# Patient Record
Sex: MALE | Race: OTHER | NOT HISPANIC OR LATINO | URBAN - METROPOLITAN AREA
[De-identification: names, ages, dates, MRNs, and addresses within clinical notes are randomized per-mention and may not be internally consistent; named-entity substitution may affect disease eponyms.]

---

## 2018-05-30 ENCOUNTER — EMERGENCY (EMERGENCY)
Facility: HOSPITAL | Age: 63
LOS: 1 days | Discharge: HOME | End: 2018-05-30
Attending: EMERGENCY MEDICINE | Admitting: EMERGENCY MEDICINE
Payer: MEDICAID

## 2018-05-30 VITALS
RESPIRATION RATE: 18 BRPM | OXYGEN SATURATION: 100 % | HEART RATE: 70 BPM | DIASTOLIC BLOOD PRESSURE: 72 MMHG | SYSTOLIC BLOOD PRESSURE: 134 MMHG

## 2018-05-30 VITALS
TEMPERATURE: 98 F | SYSTOLIC BLOOD PRESSURE: 160 MMHG | HEART RATE: 70 BPM | DIASTOLIC BLOOD PRESSURE: 88 MMHG | OXYGEN SATURATION: 99 % | RESPIRATION RATE: 19 BRPM

## 2018-05-30 LAB
ALBUMIN SERPL ELPH-MCNC: 4 G/DL — SIGNIFICANT CHANGE UP (ref 3.3–5)
ALP SERPL-CCNC: 65 U/L — SIGNIFICANT CHANGE UP (ref 40–120)
ALT FLD-CCNC: 17 U/L — SIGNIFICANT CHANGE UP (ref 4–41)
APPEARANCE UR: CLEAR — SIGNIFICANT CHANGE UP
AST SERPL-CCNC: 16 U/L — SIGNIFICANT CHANGE UP (ref 4–40)
BASOPHILS # BLD AUTO: 0.02 K/UL — SIGNIFICANT CHANGE UP (ref 0–0.2)
BASOPHILS NFR BLD AUTO: 0.4 % — SIGNIFICANT CHANGE UP (ref 0–2)
BILIRUB SERPL-MCNC: 0.3 MG/DL — SIGNIFICANT CHANGE UP (ref 0.2–1.2)
BILIRUB UR-MCNC: NEGATIVE — SIGNIFICANT CHANGE UP
BLOOD UR QL VISUAL: NEGATIVE — SIGNIFICANT CHANGE UP
BUN SERPL-MCNC: 30 MG/DL — HIGH (ref 7–23)
CALCIUM SERPL-MCNC: 8.7 MG/DL — SIGNIFICANT CHANGE UP (ref 8.4–10.5)
CHLORIDE SERPL-SCNC: 104 MMOL/L — SIGNIFICANT CHANGE UP (ref 98–107)
CO2 SERPL-SCNC: 22 MMOL/L — SIGNIFICANT CHANGE UP (ref 22–31)
COLOR SPEC: SIGNIFICANT CHANGE UP
CREAT SERPL-MCNC: 1.62 MG/DL — HIGH (ref 0.5–1.3)
EOSINOPHIL # BLD AUTO: 0.12 K/UL — SIGNIFICANT CHANGE UP (ref 0–0.5)
EOSINOPHIL NFR BLD AUTO: 2.4 % — SIGNIFICANT CHANGE UP (ref 0–6)
GLUCOSE SERPL-MCNC: 111 MG/DL — HIGH (ref 70–99)
GLUCOSE UR-MCNC: NEGATIVE — SIGNIFICANT CHANGE UP
HCT VFR BLD CALC: 39.3 % — SIGNIFICANT CHANGE UP (ref 39–50)
HGB BLD-MCNC: 12.8 G/DL — LOW (ref 13–17)
IMM GRANULOCYTES # BLD AUTO: 0.02 # — SIGNIFICANT CHANGE UP
IMM GRANULOCYTES NFR BLD AUTO: 0.4 % — SIGNIFICANT CHANGE UP (ref 0–1.5)
KETONES UR-MCNC: NEGATIVE — SIGNIFICANT CHANGE UP
LEUKOCYTE ESTERASE UR-ACNC: NEGATIVE — SIGNIFICANT CHANGE UP
LYMPHOCYTES # BLD AUTO: 2.23 K/UL — SIGNIFICANT CHANGE UP (ref 1–3.3)
LYMPHOCYTES # BLD AUTO: 45 % — HIGH (ref 13–44)
MCHC RBC-ENTMCNC: 27.9 PG — SIGNIFICANT CHANGE UP (ref 27–34)
MCHC RBC-ENTMCNC: 32.6 % — SIGNIFICANT CHANGE UP (ref 32–36)
MCV RBC AUTO: 85.6 FL — SIGNIFICANT CHANGE UP (ref 80–100)
MONOCYTES # BLD AUTO: 0.4 K/UL — SIGNIFICANT CHANGE UP (ref 0–0.9)
MONOCYTES NFR BLD AUTO: 8.1 % — SIGNIFICANT CHANGE UP (ref 2–14)
NEUTROPHILS # BLD AUTO: 2.17 K/UL — SIGNIFICANT CHANGE UP (ref 1.8–7.4)
NEUTROPHILS NFR BLD AUTO: 43.7 % — SIGNIFICANT CHANGE UP (ref 43–77)
NITRITE UR-MCNC: NEGATIVE — SIGNIFICANT CHANGE UP
NRBC # FLD: 0 — SIGNIFICANT CHANGE UP
PH UR: 6.5 — SIGNIFICANT CHANGE UP (ref 4.6–8)
PLATELET # BLD AUTO: 191 K/UL — SIGNIFICANT CHANGE UP (ref 150–400)
PMV BLD: 9.5 FL — SIGNIFICANT CHANGE UP (ref 7–13)
POTASSIUM SERPL-MCNC: 4.4 MMOL/L — SIGNIFICANT CHANGE UP (ref 3.5–5.3)
POTASSIUM SERPL-SCNC: 4.4 MMOL/L — SIGNIFICANT CHANGE UP (ref 3.5–5.3)
PROT SERPL-MCNC: 6.1 G/DL — SIGNIFICANT CHANGE UP (ref 6–8.3)
PROT UR-MCNC: 20 MG/DL — SIGNIFICANT CHANGE UP
RBC # BLD: 4.59 M/UL — SIGNIFICANT CHANGE UP (ref 4.2–5.8)
RBC # FLD: 12.5 % — SIGNIFICANT CHANGE UP (ref 10.3–14.5)
RBC CASTS # UR COMP ASSIST: SIGNIFICANT CHANGE UP (ref 0–?)
SODIUM SERPL-SCNC: 138 MMOL/L — SIGNIFICANT CHANGE UP (ref 135–145)
SP GR SPEC: 1.01 — SIGNIFICANT CHANGE UP (ref 1–1.04)
UROBILINOGEN FLD QL: NORMAL MG/DL — SIGNIFICANT CHANGE UP
WBC # BLD: 4.96 K/UL — SIGNIFICANT CHANGE UP (ref 3.8–10.5)
WBC # FLD AUTO: 4.96 K/UL — SIGNIFICANT CHANGE UP (ref 3.8–10.5)

## 2018-05-30 PROCEDURE — 73502 X-RAY EXAM HIP UNI 2-3 VIEWS: CPT | Mod: 26,LT

## 2018-05-30 PROCEDURE — 99284 EMERGENCY DEPT VISIT MOD MDM: CPT

## 2018-05-30 RX ORDER — IBUPROFEN 200 MG
600 TABLET ORAL ONCE
Qty: 0 | Refills: 0 | Status: COMPLETED | OUTPATIENT
Start: 2018-05-30 | End: 2018-05-30

## 2018-05-30 RX ORDER — LIDOCAINE 4 G/100G
1 CREAM TOPICAL ONCE
Qty: 0 | Refills: 0 | Status: COMPLETED | OUTPATIENT
Start: 2018-05-30 | End: 2018-05-30

## 2018-05-30 RX ORDER — DIAZEPAM 5 MG
1 TABLET ORAL
Qty: 6 | Refills: 0
Start: 2018-05-30 | End: 2018-05-31

## 2018-05-30 RX ADMIN — LIDOCAINE 1 PATCH: 4 CREAM TOPICAL at 14:57

## 2018-05-30 RX ADMIN — Medication 600 MILLIGRAM(S): at 14:57

## 2018-05-30 NOTE — ED PROVIDER NOTE - ATTENDING CONTRIBUTION TO CARE
Patient is a 61 yo M with hx of DM, HTN, hyperlipidemia here for left hip pain x 2 weeks. Pain is worse with movement, described as a constant aching pain. Patient tried tylenol for pain with mild relief. Denies injury, falls. Patient also reports dark brown red urine.     VS noted  Gen. no acute distress, Non toxic   HEENT: EOMI, mmm,   Lungs: CTAB/L no C/ W /R   CVS: RRR   Abd; Soft non tender, non distended   Ext: no edema, ttp in asis in left hip, full ROM  Skin: no rash  Neuro AAOx3 non focal clear speech  a/p: left hip pain, c/o dark urine  - Rich DAS Patient is a 61 yo M with hx of DM, HTN, hyperlipidemia here for left hip pain x 2 weeks. Pain is worse with movement, described as a constant aching pain. Patient tried tylenol for pain with mild relief. Denies injury, falls. Patient also reports dark brown red urine.     VS noted  Gen. no acute distress, Non toxic   HEENT: EOMI, mmm,   Lungs: CTAB/L no C/ W /R   CVS: RRR   Abd; Soft non tender, non distended   Ext: no edema, ttp in asis in left hip, full ROM  Skin: no rash  Neuro AAOx3 non focal clear speech  a/p: left hip pain, c/o dark urine - will check labs, urine, XR, try pain control and reassess  - Rich DAS

## 2018-05-30 NOTE — ED PROVIDER NOTE - OBJECTIVE STATEMENT
62M h/o DM, HTN, HLD presenting with L hip pain. Started 2 weeks ago, gradually worsening, constant, aching, worse with movement, improved with rest, associated with 1 episode of "brown" urine today. Denies F, CP/SOB, abd pain, N/V/D, dysuria, fall, trauma.

## 2018-05-30 NOTE — ED ADULT TRIAGE NOTE - CHIEF COMPLAINT QUOTE
Patient brought to ER from home by EMS for c/o left hip pain for the past 10 days. Patient brought to ER from home by EMS for c/o left hip pain for the past 10 days. .

## 2018-05-30 NOTE — ED PROVIDER NOTE - MEDICAL DECISION MAKING DETAILS
62M w/ above history p/w L hip pain, constant, worsening over 2 weeks, focal L hip tenderness and + straight leg raise on L, concerning for MSK injury  -xr, u/a, pain control

## 2021-11-05 PROBLEM — E78.5 HYPERLIPIDEMIA, UNSPECIFIED: Chronic | Status: ACTIVE | Noted: 2018-05-30

## 2021-11-05 PROBLEM — E11.9 TYPE 2 DIABETES MELLITUS WITHOUT COMPLICATIONS: Chronic | Status: ACTIVE | Noted: 2018-05-30

## 2021-11-05 PROBLEM — I10 ESSENTIAL (PRIMARY) HYPERTENSION: Chronic | Status: ACTIVE | Noted: 2018-05-30

## 2021-11-30 ENCOUNTER — APPOINTMENT (OUTPATIENT)
Dept: OTOLARYNGOLOGY | Facility: CLINIC | Age: 66
End: 2021-11-30
Payer: MEDICARE

## 2021-11-30 VITALS — DIASTOLIC BLOOD PRESSURE: 77 MMHG | HEART RATE: 64 BPM | TEMPERATURE: 97.2 F | SYSTOLIC BLOOD PRESSURE: 151 MMHG

## 2021-11-30 DIAGNOSIS — R09.81 NASAL CONGESTION: ICD-10-CM

## 2021-11-30 DIAGNOSIS — Z86.39 PERSONAL HISTORY OF OTHER ENDOCRINE, NUTRITIONAL AND METABOLIC DISEASE: ICD-10-CM

## 2021-11-30 DIAGNOSIS — J32.0 CHRONIC MAXILLARY SINUSITIS: ICD-10-CM

## 2021-11-30 DIAGNOSIS — H92.03 OTALGIA, BILATERAL: ICD-10-CM

## 2021-11-30 DIAGNOSIS — Z86.79 PERSONAL HISTORY OF OTHER DISEASES OF THE CIRCULATORY SYSTEM: ICD-10-CM

## 2021-11-30 PROBLEM — Z00.00 ENCOUNTER FOR PREVENTIVE HEALTH EXAMINATION: Status: ACTIVE | Noted: 2021-11-30

## 2021-11-30 PROCEDURE — 99204 OFFICE O/P NEW MOD 45 MIN: CPT | Mod: 25

## 2021-11-30 PROCEDURE — 31231 NASAL ENDOSCOPY DX: CPT

## 2021-11-30 RX ORDER — ATORVASTATIN CALCIUM 80 MG/1
TABLET, FILM COATED ORAL
Refills: 0 | Status: ACTIVE | COMMUNITY

## 2021-11-30 RX ORDER — METFORMIN HYDROCHLORIDE 625 MG/1
TABLET ORAL
Refills: 0 | Status: ACTIVE | COMMUNITY

## 2021-11-30 RX ORDER — AMOXICILLIN 875 MG/1
875 TABLET, FILM COATED ORAL
Qty: 14 | Refills: 0 | Status: ACTIVE | COMMUNITY
Start: 2021-11-30 | End: 1900-01-01

## 2021-11-30 RX ORDER — SITAGLIPTIN 100 MG/1
TABLET, FILM COATED ORAL
Refills: 0 | Status: ACTIVE | COMMUNITY

## 2021-11-30 RX ORDER — ATENOLOL 50 MG/1
TABLET ORAL
Refills: 0 | Status: ACTIVE | COMMUNITY

## 2021-11-30 NOTE — HISTORY OF PRESENT ILLNESS
[de-identified] : Patient has been seen by Dr Dela Cruz after being treated with antibiotics several times. He was recently on amoxicillin and then ciprofloxacin. He gets moderate to severe nasal congestion with a foul smelling mucus and he has pressure in the cheeks as well. He had a CT of the sinuses with Dr Dela Cruz and believes he was told he had chronic sinusitis. He uses nasal sprays with minor benefit. He has seasonal allergies as well but does not take medication for this. He had surgery for his sinuses years ago in [facility]. ON occasion he does sinus rinses

## 2021-11-30 NOTE — END OF VISIT
[FreeTextEntry3] : I saw and examined this patient in person. I have discussed with Ankita Jenkins, Physician Assistant, in detail the above note and agree with the above assessment and plan of care.\par

## 2021-11-30 NOTE — ASSESSMENT
[FreeTextEntry1] : Patient diabetic has a history of having sinus surgery in his country [facility] in the past been complaining of some sinus-like symptoms referred here for done by  been treated with 2 courses of antibiotics he gets better with antibiotics he had a CAT scan which we do not have available for us today.  Endoscopically there is no pus or purulence there is no acute tenderness obviously by history seems to have chronic sinusitis I put him again on another course of antibiotics with the understanding that treatment for chronic sinusitis is antibiotics antibiotics antibiotics we will review the CAT scan and obviously will try and everything we can do to avoid surgery he will continue with his regimen of nasal sprays as well as sinus rinses and will follow up and see us in 2 months at which time we will reassess him.  All of his questions and his wife's questions who is with him were answered.

## 2021-12-27 ENCOUNTER — INPATIENT (INPATIENT)
Facility: HOSPITAL | Age: 66
LOS: 4 days | Discharge: HOME | DRG: 247 | End: 2022-01-01
Attending: INTERNAL MEDICINE | Admitting: HOSPITALIST
Payer: MEDICARE

## 2021-12-27 VITALS
WEIGHT: 147.93 LBS | HEART RATE: 70 BPM | TEMPERATURE: 98 F | RESPIRATION RATE: 16 BRPM | SYSTOLIC BLOOD PRESSURE: 131 MMHG | OXYGEN SATURATION: 95 % | DIASTOLIC BLOOD PRESSURE: 79 MMHG | HEIGHT: 65 IN

## 2021-12-27 LAB
ALBUMIN SERPL ELPH-MCNC: 4 G/DL — SIGNIFICANT CHANGE UP (ref 3.3–5)
ALP SERPL-CCNC: 69 U/L — SIGNIFICANT CHANGE UP (ref 40–120)
ALT FLD-CCNC: 16 U/L — SIGNIFICANT CHANGE UP (ref 10–45)
ANION GAP SERPL CALC-SCNC: 12 MMOL/L — SIGNIFICANT CHANGE UP (ref 5–17)
AST SERPL-CCNC: 15 U/L — SIGNIFICANT CHANGE UP (ref 10–40)
BASOPHILS # BLD AUTO: 0.03 K/UL — SIGNIFICANT CHANGE UP (ref 0–0.2)
BASOPHILS NFR BLD AUTO: 0.4 % — SIGNIFICANT CHANGE UP (ref 0–2)
BILIRUB SERPL-MCNC: 0.1 MG/DL — LOW (ref 0.2–1.2)
BUN SERPL-MCNC: 37 MG/DL — HIGH (ref 7–23)
CALCIUM SERPL-MCNC: 9.4 MG/DL — SIGNIFICANT CHANGE UP (ref 8.4–10.5)
CHLORIDE SERPL-SCNC: 101 MMOL/L — SIGNIFICANT CHANGE UP (ref 96–108)
CO2 SERPL-SCNC: 22 MMOL/L — SIGNIFICANT CHANGE UP (ref 22–31)
CREAT SERPL-MCNC: 1.99 MG/DL — HIGH (ref 0.5–1.3)
EOSINOPHIL # BLD AUTO: 0.33 K/UL — SIGNIFICANT CHANGE UP (ref 0–0.5)
EOSINOPHIL NFR BLD AUTO: 4 % — SIGNIFICANT CHANGE UP (ref 0–6)
GLUCOSE SERPL-MCNC: 117 MG/DL — HIGH (ref 70–99)
HCT VFR BLD CALC: 40.1 % — SIGNIFICANT CHANGE UP (ref 39–50)
HGB BLD-MCNC: 12.8 G/DL — LOW (ref 13–17)
IMM GRANULOCYTES NFR BLD AUTO: 0.2 % — SIGNIFICANT CHANGE UP (ref 0–1.5)
LIDOCAIN IGE QN: 73 U/L — HIGH (ref 7–60)
LYMPHOCYTES # BLD AUTO: 1.43 K/UL — SIGNIFICANT CHANGE UP (ref 1–3.3)
LYMPHOCYTES # BLD AUTO: 17.5 % — SIGNIFICANT CHANGE UP (ref 13–44)
MCHC RBC-ENTMCNC: 27.9 PG — SIGNIFICANT CHANGE UP (ref 27–34)
MCHC RBC-ENTMCNC: 31.9 GM/DL — LOW (ref 32–36)
MCV RBC AUTO: 87.4 FL — SIGNIFICANT CHANGE UP (ref 80–100)
MONOCYTES # BLD AUTO: 0.68 K/UL — SIGNIFICANT CHANGE UP (ref 0–0.9)
MONOCYTES NFR BLD AUTO: 8.3 % — SIGNIFICANT CHANGE UP (ref 2–14)
NEUTROPHILS # BLD AUTO: 5.67 K/UL — SIGNIFICANT CHANGE UP (ref 1.8–7.4)
NEUTROPHILS NFR BLD AUTO: 69.6 % — SIGNIFICANT CHANGE UP (ref 43–77)
NRBC # BLD: 0 /100 WBCS — SIGNIFICANT CHANGE UP (ref 0–0)
NT-PROBNP SERPL-SCNC: 106 PG/ML — SIGNIFICANT CHANGE UP (ref 0–300)
PLATELET # BLD AUTO: 177 K/UL — SIGNIFICANT CHANGE UP (ref 150–400)
POTASSIUM SERPL-MCNC: 5.1 MMOL/L — SIGNIFICANT CHANGE UP (ref 3.5–5.3)
POTASSIUM SERPL-SCNC: 5.1 MMOL/L — SIGNIFICANT CHANGE UP (ref 3.5–5.3)
PROT SERPL-MCNC: 6.2 G/DL — SIGNIFICANT CHANGE UP (ref 6–8.3)
RBC # BLD: 4.59 M/UL — SIGNIFICANT CHANGE UP (ref 4.2–5.8)
RBC # FLD: 12.6 % — SIGNIFICANT CHANGE UP (ref 10.3–14.5)
SARS-COV-2 RNA SPEC QL NAA+PROBE: SIGNIFICANT CHANGE UP
SODIUM SERPL-SCNC: 135 MMOL/L — SIGNIFICANT CHANGE UP (ref 135–145)
TROPONIN T, HIGH SENSITIVITY RESULT: 9 NG/L — SIGNIFICANT CHANGE UP (ref 0–51)
TROPONIN T, HIGH SENSITIVITY RESULT: 9 NG/L — SIGNIFICANT CHANGE UP (ref 0–51)
WBC # BLD: 8.16 K/UL — SIGNIFICANT CHANGE UP (ref 3.8–10.5)
WBC # FLD AUTO: 8.16 K/UL — SIGNIFICANT CHANGE UP (ref 3.8–10.5)

## 2021-12-27 PROCEDURE — 99220: CPT

## 2021-12-27 PROCEDURE — 71046 X-RAY EXAM CHEST 2 VIEWS: CPT | Mod: 26

## 2021-12-27 PROCEDURE — 76705 ECHO EXAM OF ABDOMEN: CPT | Mod: 26,RT

## 2021-12-27 PROCEDURE — 93010 ELECTROCARDIOGRAM REPORT: CPT

## 2021-12-27 RX ORDER — INSULIN LISPRO 100/ML
VIAL (ML) SUBCUTANEOUS
Refills: 0 | Status: DISCONTINUED | OUTPATIENT
Start: 2021-12-27 | End: 2022-01-01

## 2021-12-27 RX ORDER — SIMVASTATIN 20 MG/1
40 TABLET, FILM COATED ORAL ONCE
Refills: 0 | Status: COMPLETED | OUTPATIENT
Start: 2021-12-27 | End: 2021-12-27

## 2021-12-27 RX ORDER — DEXTROSE 50 % IN WATER 50 %
25 SYRINGE (ML) INTRAVENOUS ONCE
Refills: 0 | Status: DISCONTINUED | OUTPATIENT
Start: 2021-12-27 | End: 2022-01-01

## 2021-12-27 RX ORDER — GLUCAGON INJECTION, SOLUTION 0.5 MG/.1ML
1 INJECTION, SOLUTION SUBCUTANEOUS ONCE
Refills: 0 | Status: DISCONTINUED | OUTPATIENT
Start: 2021-12-27 | End: 2022-01-01

## 2021-12-27 RX ORDER — ASPIRIN/CALCIUM CARB/MAGNESIUM 324 MG
162 TABLET ORAL ONCE
Refills: 0 | Status: COMPLETED | OUTPATIENT
Start: 2021-12-27 | End: 2021-12-27

## 2021-12-27 RX ORDER — FAMOTIDINE 10 MG/ML
20 INJECTION INTRAVENOUS ONCE
Refills: 0 | Status: DISCONTINUED | OUTPATIENT
Start: 2021-12-27 | End: 2021-12-27

## 2021-12-27 RX ORDER — MORPHINE SULFATE 50 MG/1
2 CAPSULE, EXTENDED RELEASE ORAL ONCE
Refills: 0 | Status: DISCONTINUED | OUTPATIENT
Start: 2021-12-27 | End: 2021-12-27

## 2021-12-27 RX ORDER — LISINOPRIL 2.5 MG/1
20 TABLET ORAL DAILY
Refills: 0 | Status: DISCONTINUED | OUTPATIENT
Start: 2021-12-27 | End: 2021-12-28

## 2021-12-27 RX ORDER — SODIUM CHLORIDE 9 MG/ML
1000 INJECTION INTRAMUSCULAR; INTRAVENOUS; SUBCUTANEOUS ONCE
Refills: 0 | Status: COMPLETED | OUTPATIENT
Start: 2021-12-27 | End: 2021-12-27

## 2021-12-27 RX ORDER — DEXTROSE 50 % IN WATER 50 %
12.5 SYRINGE (ML) INTRAVENOUS ONCE
Refills: 0 | Status: DISCONTINUED | OUTPATIENT
Start: 2021-12-27 | End: 2022-01-01

## 2021-12-27 RX ORDER — FAMOTIDINE 10 MG/ML
20 INJECTION INTRAVENOUS ONCE
Refills: 0 | Status: COMPLETED | OUTPATIENT
Start: 2021-12-27 | End: 2021-12-27

## 2021-12-27 RX ORDER — INSULIN LISPRO 100/ML
VIAL (ML) SUBCUTANEOUS AT BEDTIME
Refills: 0 | Status: DISCONTINUED | OUTPATIENT
Start: 2021-12-27 | End: 2022-01-01

## 2021-12-27 RX ORDER — INSULIN GLARGINE 100 [IU]/ML
15 INJECTION, SOLUTION SUBCUTANEOUS ONCE
Refills: 0 | Status: COMPLETED | OUTPATIENT
Start: 2021-12-27 | End: 2021-12-27

## 2021-12-27 RX ORDER — DEXTROSE 50 % IN WATER 50 %
15 SYRINGE (ML) INTRAVENOUS ONCE
Refills: 0 | Status: DISCONTINUED | OUTPATIENT
Start: 2021-12-27 | End: 2022-01-01

## 2021-12-27 RX ORDER — SODIUM CHLORIDE 9 MG/ML
1000 INJECTION, SOLUTION INTRAVENOUS
Refills: 0 | Status: DISCONTINUED | OUTPATIENT
Start: 2021-12-27 | End: 2022-01-01

## 2021-12-27 RX ADMIN — Medication 162 MILLIGRAM(S): at 16:54

## 2021-12-27 RX ADMIN — FAMOTIDINE 20 MILLIGRAM(S): 10 INJECTION INTRAVENOUS at 16:54

## 2021-12-27 RX ADMIN — SODIUM CHLORIDE 1000 MILLILITER(S): 9 INJECTION INTRAMUSCULAR; INTRAVENOUS; SUBCUTANEOUS at 19:21

## 2021-12-27 RX ADMIN — MORPHINE SULFATE 2 MILLIGRAM(S): 50 CAPSULE, EXTENDED RELEASE ORAL at 23:40

## 2021-12-27 RX ADMIN — Medication 30 MILLILITER(S): at 16:54

## 2021-12-27 RX ADMIN — INSULIN GLARGINE 15 UNIT(S): 100 INJECTION, SOLUTION SUBCUTANEOUS at 23:39

## 2021-12-27 RX ADMIN — SIMVASTATIN 40 MILLIGRAM(S): 20 TABLET, FILM COATED ORAL at 23:40

## 2021-12-27 NOTE — ED ADULT NURSE REASSESSMENT NOTE - NS ED NURSE REASSESS COMMENT FT1
Pt says his chest pain is still present and gradually worsening. MD Simpson made aware, repeat EKG preformed and given to MD Vera. Awaiting US Pt says his chest pain is still present and gradually worsening. MD Simpson made aware, repeat EKG preformed and given to MD Vera, per MD pt okay to go to US. Pt transported up to US by RN

## 2021-12-27 NOTE — ED PROVIDER NOTE - PROGRESS NOTE DETAILS
Attending/MD Sheryl. pending US, spoke with the us tach, no transporter could be arranged, this causes the significant delay of the care for the pt, I called charge nurse, unsuccessful, will continue communicatinge and escalating the issue to admisnitrative, will arrange transportation to US.

## 2021-12-27 NOTE — ED PROVIDER NOTE - EKG ADDITIONAL INFORMATION FREE TEXT
NSR, no ST segement alterations. sinus rhythm without life-threatening arrhythmic patter such as short or long NE interval, ipsilon wave form, or Brugada pattern.

## 2021-12-27 NOTE — ED CDU PROVIDER INITIAL DAY NOTE - OBJECTIVE STATEMENT
65 yo M pmhx DM, p/w 1 week of worsening chest pain. The pain started worsening this am is located in epigastrium is dull intermittent with no radiation. Pt has no hx of similar episodes. Denies SOB cough f/c HA n/v abd pain. Pt does not have a cardiologist.  In ED, patient had ekg no signs of acute ischemia, troponin 9--9, chest x ray no signs of acute pathology. Normal right upper quadrant abdominal ultrasound. Pt sent to CDU for frequent reeval, vitals q 4hrs, telemetry monitoring and Nuclear stress.

## 2021-12-27 NOTE — ED PROVIDER NOTE - CLINICAL SUMMARY MEDICAL DECISION MAKING FREE TEXT BOX
see attg note. 67 yo M p/w epigastric pain for 1 week worsened today. VSS TTP epigastrium on exam. Concern for acs vs pancreatitis vs cholecystitis. abd soft less concern for perforation. Will get labs lipase BNP trop ekg cxr RUQ US. Give asa pepcid maalox and reassess  see attng note

## 2021-12-27 NOTE — ED PROVIDER NOTE - OBJECTIVE STATEMENT
67 yo M pmhx DM, p/w 1 week of worsening chest pain. The pain started worsening this am is located in epigastrium is dull intermittent with no radiation. Pt has no hx of similar episodes. Denies SOB cough f/c HA n/v abd pain. Pt does not have a cardiologist.

## 2021-12-27 NOTE — ED CDU PROVIDER INITIAL DAY NOTE - ATTENDING CONTRIBUTION TO CARE
ATTENDING, MD Sheryl: I have personally performed a face to face diagnostic evaluation on this patient. I performed a substantive portion of the visit including all aspects of the medical decision making. I have reviewed the ACP note and agree with the history, exam, and plan of care, except as noted here. Progress notes and further evaluation to be reviewed by observation and discharging attending.

## 2021-12-27 NOTE — ED ADULT NURSE NOTE - OBJECTIVE STATEMENT
66y male A&Ox4 pmhx of DM, HTN, HLD presenting to ED complaining of chest pain. as per EMS pt is complaining of abd pain and chest pain that began at 0930 this morning 66y male A&Ox4 pmhx of DM, HTN, HLD presenting to ED complaining of intermittent dull chest pain. as per EMS pt is complaining of abd pain and chest pain that began at 0930 this morning. pt denies current CP, SOB, nausea/vomiting, numbness/tingling, fever, cough, chills, dizziness, headache, blurred vision, neuro intact. pt a&ox3, lung sounds clear, heart rate regular, on cardiac monitor, EKG completed handed to MD, abdomen soft nontender nondistended to palp. skin intact. IV intact and patent. Will continue to monitor and assess while offering support and reassurance.

## 2021-12-27 NOTE — ED ADULT NURSE REASSESSMENT NOTE - NS ED NURSE REASSESS COMMENT FT1
Report received from ANTONIA Brown. Pt a & o x 4, able to follow commands. Breathing spontaneous & nonlabored, on cardiac monitor NSR. Abdomen soft & nondistended. IV site patent, no signs of phlebitis, flushing without difficulty. Call bell within reach, bed in lowest position.

## 2021-12-27 NOTE — ED PROVIDER NOTE - PHYSICAL EXAMINATION
Gen: NAD, non-toxic appearing  Head: normal appearing  HEENT: normal conjunctiva, oral mucosa moist  Lung: no respiratory distress, speaking in full sentences, CTA b/l     CV: regular rate and rhythm, no murmurs  Abd: soft, non distended, TTP epigastrium   MSK: no visible deformities  Neuro: No focal deficits, AAOx3  Skin: Warm  Psych: normal affect

## 2021-12-27 NOTE — ED PROVIDER NOTE - ATTENDING CONTRIBUTION TO CARE
I have personally seen and examined this patient.  I have fully participated in the care of this patient. I performed a substantive portion of the visit including all aspects of the medical decision making. I have reviewed all pertinent clinical information, including history, physical exam, plan and the Resident’s note and agree except as noted. - MD Sheryl.    65 yo M, with DM, non smoker or non family h/o HA, p/w chest pain, substernal, dull pain started this morning, pt has had the pain, on-off, each lasts 30 minutes in the past 1 wk, more frequently having the pain, now having active chest pain, denies radiaiont or diaphoresis, denies any factor associated with the pain, had stress test yrs ago, no active cardiologist follow up. will get RUQ us, if GB stone, then pt can be dc with out pt surgery follow up given no surgical abd, if no finding, needs CDU for stress test tomorrow, pt agrees with the plan.

## 2021-12-27 NOTE — ED CDU PROVIDER INITIAL DAY NOTE - MEDICAL DECISION MAKING DETAILS
Attending/MD Sheryl. 67 yo M, DM, p/w epigastric pain, more frequent in the past 1wk, given his risk and age, needs stress test to eval for the risk of angina.

## 2021-12-27 NOTE — ED CDU PROVIDER INITIAL DAY NOTE - DETAILS
CHEST PAIN  -[facility]  -CHANDANAMI  -[facility] EVAL  -STRESS TEST  -CASE D/W ATTENDING

## 2021-12-27 NOTE — ED CDU PROVIDER INITIAL DAY NOTE - PHYSICAL EXAMINATION
Gen: NAD, non-toxic appearing  Head: normal appearing  HEENT: normal conjunctiva, oral mucosa moist  Lung: no respiratory distress, speaking in full sentences, CTA b/l     CV: regular rate and rhythm, no murmurs  Abd: soft, non distended, nontender  MSK: no visible deformities  Neuro: No focal deficits, AAOx3  Skin: Warm

## 2021-12-28 DIAGNOSIS — E11.9 TYPE 2 DIABETES MELLITUS WITHOUT COMPLICATIONS: ICD-10-CM

## 2021-12-28 DIAGNOSIS — Z29.9 ENCOUNTER FOR PROPHYLACTIC MEASURES, UNSPECIFIED: ICD-10-CM

## 2021-12-28 DIAGNOSIS — I20.0 UNSTABLE ANGINA: ICD-10-CM

## 2021-12-28 DIAGNOSIS — R79.89 OTHER SPECIFIED ABNORMAL FINDINGS OF BLOOD CHEMISTRY: ICD-10-CM

## 2021-12-28 DIAGNOSIS — R07.89 OTHER CHEST PAIN: ICD-10-CM

## 2021-12-28 DIAGNOSIS — I10 ESSENTIAL (PRIMARY) HYPERTENSION: ICD-10-CM

## 2021-12-28 LAB
A1C WITH ESTIMATED AVERAGE GLUCOSE RESULT: 7.3 % — HIGH (ref 4–5.6)
ALBUMIN SERPL ELPH-MCNC: 3.9 G/DL — SIGNIFICANT CHANGE UP (ref 3.3–5)
ALP SERPL-CCNC: 59 U/L — SIGNIFICANT CHANGE UP (ref 40–120)
ALT FLD-CCNC: 16 U/L — SIGNIFICANT CHANGE UP (ref 10–45)
ANION GAP SERPL CALC-SCNC: 10 MMOL/L — SIGNIFICANT CHANGE UP (ref 5–17)
AST SERPL-CCNC: 13 U/L — SIGNIFICANT CHANGE UP (ref 10–40)
BILIRUB SERPL-MCNC: 0.4 MG/DL — SIGNIFICANT CHANGE UP (ref 0.2–1.2)
BUN SERPL-MCNC: 28 MG/DL — HIGH (ref 7–23)
CALCIUM SERPL-MCNC: 9 MG/DL — SIGNIFICANT CHANGE UP (ref 8.4–10.5)
CHLORIDE SERPL-SCNC: 101 MMOL/L — SIGNIFICANT CHANGE UP (ref 96–108)
CHOLEST SERPL-MCNC: 135 MG/DL — SIGNIFICANT CHANGE UP
CO2 SERPL-SCNC: 24 MMOL/L — SIGNIFICANT CHANGE UP (ref 22–31)
CREAT SERPL-MCNC: 1.94 MG/DL — HIGH (ref 0.5–1.3)
ESTIMATED AVERAGE GLUCOSE: 163 MG/DL — HIGH (ref 68–114)
GLUCOSE BLDC GLUCOMTR-MCNC: 126 MG/DL — HIGH (ref 70–99)
GLUCOSE BLDC GLUCOMTR-MCNC: 135 MG/DL — HIGH (ref 70–99)
GLUCOSE BLDC GLUCOMTR-MCNC: 141 MG/DL — HIGH (ref 70–99)
GLUCOSE BLDC GLUCOMTR-MCNC: 168 MG/DL — HIGH (ref 70–99)
GLUCOSE SERPL-MCNC: 93 MG/DL — SIGNIFICANT CHANGE UP (ref 70–99)
HDLC SERPL-MCNC: 28 MG/DL — LOW
LIDOCAIN IGE QN: 46 U/L — SIGNIFICANT CHANGE UP (ref 7–60)
LIPID PNL WITH DIRECT LDL SERPL: 53 MG/DL — SIGNIFICANT CHANGE UP
NON HDL CHOLESTEROL: 107 MG/DL — SIGNIFICANT CHANGE UP
POTASSIUM SERPL-MCNC: 4.5 MMOL/L — SIGNIFICANT CHANGE UP (ref 3.5–5.3)
POTASSIUM SERPL-SCNC: 4.5 MMOL/L — SIGNIFICANT CHANGE UP (ref 3.5–5.3)
PROT SERPL-MCNC: 6.5 G/DL — SIGNIFICANT CHANGE UP (ref 6–8.3)
SODIUM SERPL-SCNC: 135 MMOL/L — SIGNIFICANT CHANGE UP (ref 135–145)
TRIGL SERPL-MCNC: 272 MG/DL — HIGH
TROPONIN T, HIGH SENSITIVITY RESULT: 10 NG/L — SIGNIFICANT CHANGE UP (ref 0–51)

## 2021-12-28 PROCEDURE — 99223 1ST HOSP IP/OBS HIGH 75: CPT

## 2021-12-28 PROCEDURE — 99222 1ST HOSP IP/OBS MODERATE 55: CPT

## 2021-12-28 PROCEDURE — 99217: CPT

## 2021-12-28 RX ORDER — ACETAMINOPHEN 500 MG
650 TABLET ORAL EVERY 6 HOURS
Refills: 0 | Status: DISCONTINUED | OUTPATIENT
Start: 2021-12-28 | End: 2022-01-01

## 2021-12-28 RX ORDER — ISOSORBIDE MONONITRATE 60 MG/1
30 TABLET, EXTENDED RELEASE ORAL ONCE
Refills: 0 | Status: COMPLETED | OUTPATIENT
Start: 2021-12-28 | End: 2021-12-28

## 2021-12-28 RX ORDER — ASPIRIN/CALCIUM CARB/MAGNESIUM 324 MG
81 TABLET ORAL DAILY
Refills: 0 | Status: DISCONTINUED | OUTPATIENT
Start: 2021-12-28 | End: 2022-01-01

## 2021-12-28 RX ORDER — METFORMIN HYDROCHLORIDE 850 MG/1
1 TABLET ORAL
Qty: 0 | Refills: 0 | DISCHARGE

## 2021-12-28 RX ORDER — HEPARIN SODIUM 5000 [USP'U]/ML
5000 INJECTION INTRAVENOUS; SUBCUTANEOUS EVERY 8 HOURS
Refills: 0 | Status: DISCONTINUED | OUTPATIENT
Start: 2021-12-28 | End: 2022-01-01

## 2021-12-28 RX ORDER — INSULIN GLARGINE 100 [IU]/ML
18 INJECTION, SOLUTION SUBCUTANEOUS AT BEDTIME
Refills: 0 | Status: DISCONTINUED | OUTPATIENT
Start: 2021-12-28 | End: 2022-01-01

## 2021-12-28 RX ORDER — ATENOLOL 25 MG/1
50 TABLET ORAL DAILY
Refills: 0 | Status: DISCONTINUED | OUTPATIENT
Start: 2021-12-28 | End: 2022-01-01

## 2021-12-28 RX ORDER — INSULIN GLARGINE 100 [IU]/ML
22 INJECTION, SOLUTION SUBCUTANEOUS
Qty: 0 | Refills: 0 | DISCHARGE

## 2021-12-28 RX ORDER — LANOLIN ALCOHOL/MO/W.PET/CERES
3 CREAM (GRAM) TOPICAL AT BEDTIME
Refills: 0 | Status: DISCONTINUED | OUTPATIENT
Start: 2021-12-28 | End: 2022-01-01

## 2021-12-28 RX ORDER — ATENOLOL 25 MG/1
1 TABLET ORAL
Qty: 0 | Refills: 0 | DISCHARGE

## 2021-12-28 RX ADMIN — LISINOPRIL 20 MILLIGRAM(S): 2.5 TABLET ORAL at 08:42

## 2021-12-28 RX ADMIN — ISOSORBIDE MONONITRATE 30 MILLIGRAM(S): 60 TABLET, EXTENDED RELEASE ORAL at 20:58

## 2021-12-28 NOTE — ED CDU PROVIDER DISPOSITION NOTE - ATTENDING CONTRIBUTION TO CARE
I personally have seen and examined this patient.  Physician assistant note reviewed and agree on plan of care and except where noted. Patient re-evaluated and doing well.  No acute issues at  this time.  Lab and radiology tests reviewed with patient and/or family.  Patient stable for discharge. Attending Contribution to Care: I personally have seen and examined this patient.  Physician assistant note reviewed and agree on plan of care and except where noted. Patient re-evaluated and doing well.  No acute issues at  this time.  Lab and radiology tests reviewed with patient and/or family.  Patient stable for discharge with nl echo/nuclear stress test, cards follow up. pt with intermittent chest pain, no ekg changes or events on cr monitor.

## 2021-12-28 NOTE — CONSULT NOTE ADULT - SUBJECTIVE AND OBJECTIVE BOX
12-28-21 @ 19:11  [facility]-[id number]    CHIEF COMPLAINT:  Patient is a 66y old  Male who presents with a chief complaint of     HISTORY OF PRESENT ILLNESS:  LAYA PABLO is a 66y Male patient with past medical history of *** presenting with ***.    Allergies    No Known Allergies    Intolerances    	    PAST MEDICAL & SURGICAL HISTORY:  HTN (hypertension)    HLD (hyperlipidemia)    T2DM (type 2 diabetes mellitus)    Diabetes mellitus    Dyslipidemia    Hypertension    No significant past surgical history    No significant past surgical history        FAMILY HISTORY:  No pertinent family history in first degree relatives        Social History:      MEDICATIONS  Home Medications:      MEDICATIONS  (STANDING):  dextrose 40% Gel 15 Gram(s) Oral once  dextrose 5%. 1000 milliLiter(s) (50 mL/Hr) IV Continuous <Continuous>  dextrose 5%. 1000 milliLiter(s) (100 mL/Hr) IV Continuous <Continuous>  dextrose 50% Injectable 12.5 Gram(s) IV Push once  dextrose 50% Injectable 25 Gram(s) IV Push once  dextrose 50% Injectable 25 Gram(s) IV Push once  glucagon  Injectable 1 milliGRAM(s) IntraMuscular once  insulin lispro (ADMELOG) corrective regimen sliding scale   SubCutaneous at bedtime  insulin lispro (ADMELOG) corrective regimen sliding scale   SubCutaneous three times a day before meals  lisinopril 20 milliGRAM(s) Oral daily    MEDICATIONS  (PRN):      REVIEW OF SYSTEMS:  CONSTITUTIONAL: No fever, weight loss, or fatigue  EYES: No eye pain, visual disturbances, or discharge  ENMT:  No difficulty hearing, tinnitus, vertigo; No sinus or throat pain  NECK: No pain or stiffness  RESPIRATORY: No cough, wheezing, chills or hemoptysis; No Shortness of Breath  CARDIOVASCULAR: No chest pain, palpitations, passing out, dizziness, or leg swelling  GASTROINTESTINAL: No abdominal or epigastric pain. No nausea, vomiting, or hematemesis; No diarrhea or constipation. No melena or hematochezia.  GENITOURINARY: No dysuria, frequency, hematuria, or incontinence  NEUROLOGICAL: No headaches, memory loss, loss of strength, numbness, or tremors  SKIN: No itching, burning, rashes, or lesions   LYMPH Nodes: No enlarged glands  ENDOCRINE: No heat or cold intolerance; No hair loss  MUSCULOSKELETAL: No joint pain or swelling; No muscle, back, or extremity pain  PSYCHIATRIC: No depression, anxiety, mood swings, or difficulty sleeping  HEME/LYMPH: No easy bruising, or bleeding gums  ALLERY AND IMMUNOLOGIC: No hives or eczema	    [ ] All others negative	  [ ] Unable to obtain    PHYSICAL EXAM:  Vital Signs Last 24 Hrs  T(C): 36.7 (28 Dec 2021 16:13), Max: 37.4 (28 Dec 2021 04:34)  T(F): 98 (28 Dec 2021 16:13), Max: 99.3 (28 Dec 2021 04:34)  HR: 63 (28 Dec 2021 16:13) (63 - 81)  BP: 120/70 (28 Dec 2021 16:13) (120/70 - 139/83)  BP(mean): --  RR: 18 (28 Dec 2021 16:13) (18 - 18)  SpO2: 100% (28 Dec 2021 16:13) (95% - 100%)    Orthostatic VS      Daily     Daily     Appearance: Normal	  HEENT:   Normal oral mucosa, PERRL, EOMI	  Lymphatic: No lymphadenopathy  Cardiovascular: Normal S1 S2, No JVD, No murmurs, No edema  Respiratory: Lungs clear to auscultation	  Psychiatry: A & O x 3, Mood & affect appropriate  Gastrointestinal:  Soft, Non-tender, + BS	  Skin: No rashes, No ecchymoses, No cyanosis	  Neurologic: Non-focal  Extremities: Normal range of motion, No clubbing, cyanosis or edema  Vascular: Peripheral pulses palpable 2+ bilaterally    LABS:	 	                        12.8   8.16  )-----------( 177      ( 27 Dec 2021 17:01 )             40.1       12-28    135  |  101  |  28<H>  ----------------------------<  93  4.5   |  24  |  1.94<H>  12-27    135  |  101  |  37<H>  ----------------------------<  117<H>  5.1   |  22  |  1.99<H>    Ca    9.0      28 Dec 2021 06:07  Ca    9.4      27 Dec 2021 17:01    TPro  6.5  /  Alb  3.9  /  TBili  0.4  /  DBili  x   /  AST  13  /  ALT  16  /  AlkPhos  59  12-28  TPro  6.2  /  Alb  4.0  /  TBili  0.1<L>  /  DBili  x   /  AST  15  /  ALT  16  /  AlkPhos  69  12-27                BMI: BMI (kg/m2): 24.6 (12-27-21 @ 15:50)  HbA1c: A1C with Estimated Average Glucose Result: 7.3 % (12-28-21 @ 05:31)    Glucose: POCT Blood Glucose.: 126 mg/dL (12-28-21 @ 17:46)    BP: 120/70 (12-28-21 @ 16:13) (120/70 - 139/83)  Lipid Panel: Date/Time: 12-28-21 @ 05:24  Cholesterol, Serum: 135  Direct LDL: --  HDL Cholesterol, Serum: 28  Total Cholesterol/HDL Ration Measurement: --  Triglycerides, Serum: 272      I&O's Summary          RADIOLOGY:  CXR:  CT:  MRI:    CARDIAC TESTING/STUDIES:    Telemetry: 	    ECG:    Echocardiogram:  Stress Test:  	  Catheterization:  	  ASSESSMENT/PLAN: 	  66y Male patient with past medical history of *** presenting with ***.    ***    Zenon Yarbrough MD, MPH, YASH, RPVI, [facility]  Inpatient Cardiovascular Specialist; [facility], [facility] ([facility])  ; [employer] [facility]  message: [employer] [phone] or Microsoft Teams (text preferred and/or call)  email: [email or web address] Cardiology and Cardiovascular Surgery on-service contact/call information, go to [email or web address] and use [email or web address]" to login.  Outpatient Cardiology appointments, call  [phone] to arrange with a colleague; I do not have outpatient Cardiology clinic.  12--21 @ 19:11  [facility]-[id number]    CHIEF COMPLAINT:  CP    HISTORY OF PRESENT ILLNESS:  LAYA PABLO is a 66y Male patient with past medical history of HTN, on lisinopril, DM on metformin and lantus presenting with atypical chest pain, dull and intermittent but ongoing for several days, not relieved with rest and not particularly exacerbated with exertion. No prior heart disease. Upon presentation, ECG non-ischemic, cardiac enzymes negative. Chest pain somewhat relieved with morphine and found no improvement with GI cocktail x2. Nuclear stress test performed with ischemia in the distal anterior walls associated with WMA, as well as basal inferior, inferolateral walls suggestive of infarct with mild jeison-infarct ischemia. ECHO with preserved EF, no WMA and overall benign. Cardiology consulted for further management     Allergies  No Known Allergies	    PAST MEDICAL & SURGICAL HISTORY:  HTN (hypertension)  HLD (hyperlipidemia)  T2DM (type 2 diabetes mellitus)  Diabetes mellitus  Dyslipidemia  Hypertension  No significant past surgical history  No significant past surgical history    FAMILY HISTORY:  No pertinent family history in first degree relatives    Social History:  Former smoker    MEDICATIONS  Home Medications:  aspirin 81 mg oral tablet: 1 tab(s) orally once a day (28 Dec 2021 22:09)  atenolol 50 mg oral tablet: 1 tab(s) orally once a day (28 Dec 2021 22:09)  Lantus 100 units/mL subcutaneous solution: 22 unit(s) subcutaneous once a day (at bedtime) (28 Dec 2021 22:09)  lisinopril 20 mg oral tablet: 1 tab(s) orally once a day (28 Dec 2021 22:09)  metFORMIN 750 mg oral tablet, extended release: 1 tab(s) orally once a day (28 Dec 2021 22:09)    MEDICATIONS  (STANDING):  dextrose 40% Gel 15 Gram(s) Oral once  dextrose 5%. 1000 milliLiter(s) (50 mL/Hr) IV Continuous <Continuous>  dextrose 5%. 1000 milliLiter(s) (100 mL/Hr) IV Continuous <Continuous>  dextrose 50% Injectable 12.5 Gram(s) IV Push once  dextrose 50% Injectable 25 Gram(s) IV Push once  dextrose 50% Injectable 25 Gram(s) IV Push once  glucagon  Injectable 1 milliGRAM(s) IntraMuscular once  insulin lispro (ADMELOG) corrective regimen sliding scale   SubCutaneous at bedtime  insulin lispro (ADMELOG) corrective regimen sliding scale   SubCutaneous three times a day before meals  lisinopril 20 milliGRAM(s) Oral daily    REVIEW OF SYSTEMS:  CONSTITUTIONAL: No fever, weight loss, or fatigue  EYES: No eye pain, visual disturbances, or discharge  ENMT:  No difficulty hearing, tinnitus, vertigo; No sinus or throat pain  NECK: No pain or stiffness  RESPIRATORY: No cough, wheezing, chills or hemoptysis; No Shortness of Breath  CARDIOVASCULAR: No palpitations, passing out, dizziness, or leg swelling. POSITIVE chest pain.   GASTROINTESTINAL: No abdominal or epigastric pain. No nausea, vomiting, or hematemesis; No diarrhea or constipation. No melena or hematochezia.  GENITOURINARY: No dysuria, frequency, hematuria, or incontinence  NEUROLOGICAL: No headaches, memory loss, loss of strength, numbness, or tremors  SKIN: No itching, burning, rashes, or lesions   LYMPH Nodes: No enlarged glands  ENDOCRINE: No heat or cold intolerance; No hair loss  MUSCULOSKELETAL: No joint pain or swelling; No muscle, back, or extremity pain  PSYCHIATRIC: No depression, anxiety, mood swings, or difficulty sleeping  HEME/LYMPH: No easy bruising, or bleeding gums  ALLERY AND IMMUNOLOGIC: No hives or eczema	    PHYSICAL EXAM:  Vital Signs Last 24 Hrs  T(C): 36.7 (28 Dec 2021 16:13), Max: 37.4 (28 Dec 2021 04:34)  T(F): 98 (28 Dec 2021 16:13), Max: 99.3 (28 Dec 2021 04:34)  HR: 63 (28 Dec 2021 16:13) (63 - 81)  BP: 120/70 (28 Dec 2021 16:13) (120/70 - 139/83)  RR: 18 (28 Dec 2021 16:13) (18 - 18)  SpO2: 100% (28 Dec 2021 16:13) (95% - 100%)    Appearance: Normal	  HEENT:   Normal oral mucosa	  Lymphatic: No lymphadenopathy  Cardiovascular: Normal S1 S2, No JVD, No murmurs, No edema  Respiratory: Lungs clear to auscultation	  Psychiatry: A & O x 3  Gastrointestinal:  Soft, Non-tender  Skin: No rashes, No ecchymoses, No cyanosis	  Neurologic: Non-focal  Extremities: Normal range of motion  Vascular: Peripheral pulses palpable 2+ bilaterally    LABS:	 	                        12.8   8.16  )-----------( 177      ( 27 Dec 2021 17:01 )             40.1         135  |  101  |  28<H>  ----------------------------<  93  4.5   |  24  |  1.94<H>      135  |  101  |  37<H>  ----------------------------<  117<H>  5.1   |  22  |  1.99<H>    Ca    9.0      28 Dec 2021 06:07  Ca    9.4      27 Dec 2021 17:01    TPro  6.5  /  Alb  3.9  /  TBili  0.4  /  DBili  x   /  AST  13  /  ALT  16  /  AlkPhos  59    TPro  6.2  /  Alb  4.0  /  TBili  0.1<L>  /  DBili  x   /  AST  15  /  ALT  16  /  AlkPhos  69      BMI: BMI (kg/m2): 24.6 (21 @ 15:50)  HbA1c: A1C with Estimated Average Glucose Result: 7.3 % (21 @ 05:31)    Glucose: POCT Blood Glucose.: 126 mg/dL (21 @ 17:46)    BP: 120/70 (21 @ 16:13) (120/70 - 139/83)  Lipid Panel: Date/Time: 21 @ 05:24  Cholesterol, Serum: 135  Direct LDL: --  HDL Cholesterol, Serum: 28  Total Cholesterol/HDL Ration Measurement: --  Triglycerides, Serum: 272    RADIOLOGY:  CXR: 2021  Clear lungs.     CARDIAC TESTING/STUDIES:    EC2021  NSR, normal ECG.     Echocardiogram: 2021  Conclusions:  1. Calcified trileaflet aortic valve with normal opening.  2. Increased relative wall thickness with normal left  ventricular mass index, consistent with concentric left  ventricular remodeling.  3. Normal left ventricular systolic function. No segmental  wall motion abnormalities.  4. Increased E/e'  is consistent with elevated left  ventricular filling pressure.  5. Normal right ventricular size and function.  6. Normal pericardium with no pericardial effusion.  *** No previous Echo exam.    Stress Test:  2021  IMPRESSIONS:Abnormal Study  * Chest Pain: No chest pain with administration of  Regadenoson.  * Symptom: Shortness of breath, abdominal pain.  * HR Response: Appropriate.  * BP Response: Appropriate.  * Heart Rhythm: Sinus Rhythm - 68 BPM.  * Baseline ECG: Nonspecific ST-T wave abnormality.  * ECG Changes: No significant ischemic ST segment changes  beyond baseline abnormalities.  * Arrhythmia: None.  * Review of raw data shows: Minor motion artifact.  * The left ventricle was normal in size.  * There are medium-sized, mild defects in the mid to  distal anterior, distal anterolateral, and apical lateral  walls that are reversible suggestive of ischemia.  * There are medium-sized, mild to moderate defects in the  inferolateral, basal inferior, and basal septal walls that  are mostly fixed suggestive of infarct with mild  jeison-infarct ischemia.  * Post-stress gated wall motion analysis was performed  (LVEF = 68 %;LVEDV = 59 ml.) revealing hypokinesis of the  inferolateral, basal inferior, and basal septal walls.  Overall left ventricular ejection fraction is normal.  *** No previous Nuclear/Stress exam.    ASSESSMENT/PLAN: 	  66y Male patient with past medical history of HTN, on lisinopril, DM on metformin and lantus presenting with atypical chest pain with abnormal nuclear stress testing.     1) Chest Pain  Atypical and typical feautres   Negative cardiac enzymes  ECG non-ischemic  ECHO with preserved EF, no WMA  Nuclear stress test with ischemia in the distal anterior wall with WMA and basal inferior walls. Persevered EF.  Ongoing intermittent epigastric/chest discomfort  Multiple risk factors: former smoker, HTN, DM    ·	Given his ongoing atypical chest discomfort and multiple risk factor in the setting of positive stress testing with mild to moderate territory of ischemia, recommend proceeding with cardiac cath to rule out obstructive CAD.   ·	Continue medical management with aspirin 81 mg daily, atenalol 50 mg daily (may transition to metoprolol succinurate pending results of cath).  ·	Can hold lisinopril 20 mg daily the morning of cath given his BP remains well controlled Resume thereafter.   ·	Start atorvastatin 10-20 mg nightly.   ·	Strongly encourage 30 minutes of moderate-intensity exercise daily equivalent to 3 mi/hr of brisk walking as well as a heart healthy diet such as a vegan or Mediterranean diet which will substantially reduce cardiovascular events.    2) HTN   Well controlled.     ·	Continue medical management with lisinopril 20 mg daily after cath.     3) DM  HgA1c 7.3%    ·	Continue medical management with metformin 750 mg BID and Lantus   ·	Diet modification.   ·	Start atorvastatin 10-20 mg nightly.     Zenon Yarbrough MD, MPH, YASH, RPVI, [facility]  Inpatient Cardiovascular Specialist; [facility], [facility])  ; [employer] [facility]  message: [email or web address] [phone] or Microsoft Teams (text preferred and/or call)  email: [email or web address] Cardiology and Cardiovascular Surgery on-service contact/call information, go to [email or web address] and use "[email or web address]" to login.  Outpatient Cardiology appointments, call  [phone] to arrange with a colleague; I do not have outpatient Cardiology clinic.

## 2021-12-28 NOTE — ED CDU PROVIDER SUBSEQUENT DAY NOTE - HISTORY
CDU PROGRESS NOTE KAYLEE YOUSSEF: Pt resting comfortably, NAD, VSS. No events on telemetry. Will continue to monitor, Nuclear stress in am.

## 2021-12-28 NOTE — H&P ADULT - HISTORY OF PRESENT ILLNESS
65 yo male PMHX [place]ron[place] male pmhx HTN, insulin dependent diabetes type 2 admitted wiht progressive chest pain. Patient states approximately 2 weeks ago as was lying down for bed experienced midsternal chest pain x 30 minutes, pressure. This has intermittently been going on and now has occurred at rest currently.  Patient presented to ED as pain did not go away. Was initially observed in CDU had positive stress test and admitted for cath. Patient currently with 1/10 chest pain, midsternal, non-radiating, pressure like pain, much improved from presentation No HARMON, SOB, abdominal pain, n/v/d/c.

## 2021-12-28 NOTE — H&P ADULT - NSHPLABSRESULTS_GEN_ALL_CORE
12.8   8.16  )-----------( 177      ( 27 Dec 2021 17:01 )             40.1   12-28    135  |  101  |  28<H>  ----------------------------<  93  4.5   |  24  |  1.94<H>    Ca    9.0      28 Dec 2021 06:07    TPro  6.5  /  Alb  3.9  /  TBili  0.4  /  DBili  x   /  AST  13  /  ALT  16  /  AlkPhos  59  12-28      ECG personally reviewed: NJ depression isolated lead II. No ST segment changes  < from: Nuclear Stress Test-Pharmacologic (Nuclear Stress Test-Pharmacologic .) (12.28.21 @ 12:09) >    IMPRESSIONS:Abnormal Study  * Chest Pain: No chest pain with administration of  Regadenoson.  * Symptom: Shortness of breath, abdominal pain.  * HR Response: Appropriate.  * BP Response: Appropriate.  * Heart Rhythm: Sinus Rhythm - 68 BPM.  * Baseline ECG: Nonspecific ST-T wave abnormality.  * ECG Changes: No significant ischemic ST segment changes  beyond baseline abnormalities.  * Arrhythmia: None.  * Review of raw data shows: Minor motion artifact.  * The left ventricle was normal in size.  * There are medium-sized, mild defects in the mid to  distal anterior, distal anterolateral, and apical lateral  walls that are reversible suggestive of ischemia.  * There are medium-sized, mild to moderate defects in the  inferolateral, basal inferior, and basal septal walls that  are mostly fixed suggestive of infarct with mild  jeison-infarct ischemia.  * Post-stress gated wall motion analysis was performed  (LVEF = 68 %;LVEDV = 59 ml.) revealing hypokinesis of the  inferolateral, basal inferior, and basal septal walls.  Overall left ventricular ejection fraction is normal.  *** No previous Nuclear/Stress exam.  ----------------------------------------------    < end of copied text >

## 2021-12-28 NOTE — ED ADULT NURSE REASSESSMENT NOTE - NS ED NURSE REASSESS COMMENT FT1
Pt received from ANTONIA Philip. Pt oriented to CDU & plan of care was discussed. Pt A&O x 4. Pt in CDU for tele, and stress in am. Pt c/o 5/10 chest pain, declines anything for pain at this time, KAYLEE Mata aware. Pt denies any SOB, dizziness or palpitations. Pt on a cardiac monitor in NSR, HR in 80's. Pt pending trops. V/S stable, pt afebrile,  IV in place, patent and free of signs of infiltration. Pt resting in bed. Safety & comfort measures maintained. Call bell in reach. Will continue to monitor.

## 2021-12-28 NOTE — H&P ADULT - NSHPPHYSICALEXAM_GEN_ALL_CORE
Vital Signs Last 24 Hrs  T(C): 36.4 (28 Dec 2021 20:20), Max: 37.4 (28 Dec 2021 04:34)  T(F): 97.6 (28 Dec 2021 20:20), Max: 99.3 (28 Dec 2021 04:34)  HR: 72 (28 Dec 2021 20:20) (63 - 81)  BP: 124/72 (28 Dec 2021 20:20) (120/70 - 139/83)  BP(mean): --  RR: 18 (28 Dec 2021 20:20) (18 - 18)  SpO2: 97% (28 Dec 2021 20:20) (95% - 100%)

## 2021-12-28 NOTE — H&P ADULT - PROBLEM SELECTOR PLAN 4
unclear if BESSY or CKD  -would attempt to clarify with PMD in AM (no older creatinine in HIE)  -avoid nephrotoxic agents.

## 2021-12-28 NOTE — ED CDU PROVIDER DISPOSITION NOTE - CLINICAL COURSE
67 yo M pmhx DM, p/w 1 week of worsening chest pain. The pain started worsening this am is located in epigastrium is dull intermittent with no radiation. Pt has no hx of similar episodes. Denies SOB cough f/c HA n/v abd pain. Pt does not have a cardiologist.  In ED, patient had ekg no signs of acute ischemia, troponin 9--9, chest x ray no signs of acute pathology. Normal right upper quadrant abdominal ultrasound. Pt sent to CDU for frequent reeval, vitals q 4hrs, telemetry monitoring and Nuclear stress. 67 yo M pmhx DM, p/w 1 week of worsening chest pain. The pain started worsening this am is located in epigastrium is dull intermittent with no radiation. Pt has no hx of similar episodes. Denies SOB cough f/c HA n/v abd pain. Pt does not have a cardiologist.  In ED, patient had ekg no signs of acute ischemia, troponin 9--9, chest x ray no signs of acute pathology. Normal right upper quadrant abdominal ultrasound. Pt sent to CDU for frequent reeval, vitals q 4hrs, telemetry monitoring and Nuclear stress.  In CDU, abnormal stress test showed medium-sized, mild to moderate defects in the inferolateral, basal inferior, and basal septal walls that are mostly fixed suggestive of infarct with mild jeison-infarct ischemia. c/d/w Cardiology Dr. Yarbrough, will start Indur ER 30mg po and admit to tele for Cath in am.

## 2021-12-28 NOTE — H&P ADULT - NSHPSOCIALHISTORY_GEN_ALL_CORE
not smoker, not drinker. 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   Vaccinated with booster.

## 2021-12-28 NOTE — ED ADULT NURSE REASSESSMENT NOTE - NS ED NURSE REASSESS COMMENT FT1
07.00 Am Received the Pt from  ANTONIA David . Pt is Observed for CP for ECHO & Nuclear stress test  . Received the Pt A&OX 4 obeys commands Fabi N/V/D fever chills cp SOB   Comfort care & safety measures continued  IV site looks clean & dry no signs of infiltration noted pt denies  pain IV site .  Pt is advised to call for help  call bell with in the reach pt verbalized the understanding . Pt states  he has l chest pain  4/10  pending CDU  MD ruano . GCS 15/15 A&OX 4 PERRLA  size 3 Strong upper & lower extremities steady gait  Pt is ambulatory & independent  No facial droop  No Hand Leg drop denies numbness tingling Continue to monitor Pt is NSR on the monitor

## 2021-12-28 NOTE — ED CDU PROVIDER SUBSEQUENT DAY NOTE - ATTENDING CONTRIBUTION TO CARE
I personally have seen and examined this patient.  Physician assistant note reviewed and agree on plan of care and except where noted. Patient re-evaluated and doing well.  No acute issues at  this time.  Lab and radiology tests reviewed with patient and/or family.  Patient stable for discharge with nl echo/nuclear stress test, cards follow up. pt with intermittent chest pain, no ekg changes or events on cr monitor.

## 2021-12-28 NOTE — H&P ADULT - NSICDXPASTMEDICALHX_GEN_ALL_CORE_FT
PAST MEDICAL HISTORY:  Diabetes mellitus     Dyslipidemia     HLD (hyperlipidemia)     HTN (hypertension)     Hypertension     T2DM (type 2 diabetes mellitus)

## 2021-12-28 NOTE — H&P ADULT - PROBLEM SELECTOR PLAN 1
patient with chest pain at rest, has risk factors and positive stress test.   -for cath per ED note in discussion with cardiology.   -continue with home ASA, atenolol.  -given creatinine (patient unclear if has known CKD), would HOLD ACE-i  -currently with minimal chest pain, would hold off hep gtt.   -cards consulted, note started

## 2021-12-28 NOTE — ED CDU PROVIDER SUBSEQUENT DAY NOTE - PROGRESS NOTE DETAILS
Patient seen and evaluated at bedside, resting comfortably, NAD. Reports CP has improved. No SOB. No other complaints. VSS. No events on tele. Plan for stress test today. Called by cards fellow in stress lab requesting echo prior to stress test. Will expedite. Pt at cardiac testing. -Johnson Carpenter PA-C Patient seen and evaluated at bedside, resting comfortably, NAD. Reports CP has improved. No SOB. No other complaints. VSS. No events on tele. Plan for stress test today. Cr 1.62 in 2018 per HIE review. Patient back in room resting comfortably, NAD. Echo and stress test completed, pending results. Pt with abnormal stress test results. Echo results still pending. D/w cardiology attg Dr. Yarbruogh for consult. CDU PROGRESS NOTE KAYLEE YOUSSEF: Received pt at 1900 sign-out. Case/plan reviewed. VSS. Pt w/ abnormal stress test  showing medium-sized, mild to moderate defects in the inferolateral, basal inferior, and basal septal walls that are mostly fixed suggestive of infarct with mild jeison-infarct ischemia. c/d/w Cardiology Dr. Yarbrough, will start Indur ER 30mg po and admit to tele for Cath in am.

## 2021-12-28 NOTE — ED CDU PROVIDER DISPOSITION NOTE - NSFOLLOWUPINSTRUCTIONS_ED_ALL_ED_FT
1. Follow up with your PMD within 48-72 hours.   You may schedule appointment with Cardiology clinic this week by calling [phone]  2. Show copies of your reports given to you. Recommend Aspirin 81mg over the counter daily until further evaluation.  Take all of your other medications as previously prescribed.   3. Worsening or continued chest pain, shortness of breath, weakness, return to ED.

## 2021-12-28 NOTE — H&P ADULT - ASSESSMENT
65 yo male insulin dependent DM type 2, HTN presents with unstable angina admitted with positive stress test for cath .

## 2021-12-29 LAB
ANION GAP SERPL CALC-SCNC: 12 MMOL/L — SIGNIFICANT CHANGE UP (ref 5–17)
BUN SERPL-MCNC: 34 MG/DL — HIGH (ref 7–23)
CALCIUM SERPL-MCNC: 9.2 MG/DL — SIGNIFICANT CHANGE UP (ref 8.4–10.5)
CHLORIDE SERPL-SCNC: 103 MMOL/L — SIGNIFICANT CHANGE UP (ref 96–108)
CO2 SERPL-SCNC: 21 MMOL/L — LOW (ref 22–31)
CREAT SERPL-MCNC: 2.06 MG/DL — HIGH (ref 0.5–1.3)
GLUCOSE BLDC GLUCOMTR-MCNC: 107 MG/DL — HIGH (ref 70–99)
GLUCOSE BLDC GLUCOMTR-MCNC: 156 MG/DL — HIGH (ref 70–99)
GLUCOSE BLDC GLUCOMTR-MCNC: 171 MG/DL — HIGH (ref 70–99)
GLUCOSE SERPL-MCNC: 266 MG/DL — HIGH (ref 70–99)
HCT VFR BLD CALC: 38.6 % — LOW (ref 39–50)
HCV AB S/CO SERPL IA: 0.07 S/CO — SIGNIFICANT CHANGE UP (ref 0–0.99)
HCV AB SERPL-IMP: SIGNIFICANT CHANGE UP
HGB BLD-MCNC: 12.6 G/DL — LOW (ref 13–17)
MCHC RBC-ENTMCNC: 28.3 PG — SIGNIFICANT CHANGE UP (ref 27–34)
MCHC RBC-ENTMCNC: 32.6 GM/DL — SIGNIFICANT CHANGE UP (ref 32–36)
MCV RBC AUTO: 86.5 FL — SIGNIFICANT CHANGE UP (ref 80–100)
NRBC # BLD: 0 /100 WBCS — SIGNIFICANT CHANGE UP (ref 0–0)
PLATELET # BLD AUTO: 163 K/UL — SIGNIFICANT CHANGE UP (ref 150–400)
POTASSIUM SERPL-MCNC: 4.9 MMOL/L — SIGNIFICANT CHANGE UP (ref 3.5–5.3)
POTASSIUM SERPL-SCNC: 4.9 MMOL/L — SIGNIFICANT CHANGE UP (ref 3.5–5.3)
RBC # BLD: 4.46 M/UL — SIGNIFICANT CHANGE UP (ref 4.2–5.8)
RBC # FLD: 12.4 % — SIGNIFICANT CHANGE UP (ref 10.3–14.5)
SARS-COV-2 RNA SPEC QL NAA+PROBE: SIGNIFICANT CHANGE UP
SODIUM SERPL-SCNC: 136 MMOL/L — SIGNIFICANT CHANGE UP (ref 135–145)
WBC # BLD: 4.81 K/UL — SIGNIFICANT CHANGE UP (ref 3.8–10.5)
WBC # FLD AUTO: 4.81 K/UL — SIGNIFICANT CHANGE UP (ref 3.8–10.5)

## 2021-12-29 PROCEDURE — 93454 CORONARY ARTERY ANGIO S&I: CPT | Mod: 26

## 2021-12-29 RX ORDER — SODIUM CHLORIDE 9 MG/ML
400 INJECTION INTRAMUSCULAR; INTRAVENOUS; SUBCUTANEOUS ONCE
Refills: 0 | Status: DISCONTINUED | OUTPATIENT
Start: 2021-12-29 | End: 2022-01-01

## 2021-12-29 RX ORDER — CLOPIDOGREL BISULFATE 75 MG/1
75 TABLET, FILM COATED ORAL DAILY
Refills: 0 | Status: DISCONTINUED | OUTPATIENT
Start: 2021-12-30 | End: 2022-01-01

## 2021-12-29 RX ORDER — CLOPIDOGREL BISULFATE 75 MG/1
300 TABLET, FILM COATED ORAL ONCE
Refills: 0 | Status: COMPLETED | OUTPATIENT
Start: 2021-12-29 | End: 2021-12-29

## 2021-12-29 RX ADMIN — CLOPIDOGREL BISULFATE 300 MILLIGRAM(S): 75 TABLET, FILM COATED ORAL at 12:55

## 2021-12-29 RX ADMIN — ATENOLOL 50 MILLIGRAM(S): 25 TABLET ORAL at 06:04

## 2021-12-29 RX ADMIN — Medication 1: at 15:20

## 2021-12-29 RX ADMIN — Medication 1: at 08:21

## 2021-12-29 RX ADMIN — HEPARIN SODIUM 5000 UNIT(S): 5000 INJECTION INTRAVENOUS; SUBCUTANEOUS at 06:05

## 2021-12-29 RX ADMIN — Medication 81 MILLIGRAM(S): at 11:41

## 2021-12-29 RX ADMIN — INSULIN GLARGINE 18 UNIT(S): 100 INJECTION, SOLUTION SUBCUTANEOUS at 22:08

## 2021-12-29 RX ADMIN — HEPARIN SODIUM 5000 UNIT(S): 5000 INJECTION INTRAVENOUS; SUBCUTANEOUS at 22:09

## 2021-12-29 RX ADMIN — INSULIN GLARGINE 18 UNIT(S): 100 INJECTION, SOLUTION SUBCUTANEOUS at 02:32

## 2021-12-29 NOTE — PATIENT PROFILE ADULT - FALL HARM RISK - UNIVERSAL INTERVENTIONS
normal...
Bed in lowest position, wheels locked, appropriate side rails in place/Call bell, personal items and telephone in reach/Instruct patient to call for assistance before getting out of bed or chair/Non-slip footwear when patient is out of bed/[place] to call system/Physically safe environment - no spills, clutter or unnecessary equipment/Purposeful Proactive Rounding/Room/bathroom lighting operational, light cord in reach

## 2021-12-29 NOTE — PROGRESS NOTE ADULT - SUBJECTIVE AND OBJECTIVE BOX
Patient is a 66y old  Male who presents with a chief complaint of chest pain, positive stress test (28 Dec 2021 22:02)      INTERVAL HPI/OVERNIGHT EVENTS:  T(C): 36.6 (12-29-21 @ 20:10), Max: 36.6 (12-29-21 @ 11:49)  HR: 62 (12-29-21 @ 20:10) (55 - 77)  BP: 135/69 (12-29-21 @ 20:10) (104/60 - 135/69)  RR: 17 (12-29-21 @ 20:10) (16 - 19)  SpO2: 97% (12-29-21 @ 20:10) (92% - 99%)  Wt(kg): --  I&O's Summary    29 Dec 2021 07:01  -  29 Dec 2021 23:35  --------------------------------------------------------  IN: 400 mL / OUT: 0 mL / NET: 400 mL        PAST MEDICAL & SURGICAL HISTORY:  HTN (hypertension)    HLD (hyperlipidemia)    T2DM (type 2 diabetes mellitus)    Diabetes mellitus    Dyslipidemia    Hypertension    No significant past surgical history    No significant past surgical history        SOCIAL HISTORY  Alcohol:  Tobacco:  Illicit substance use:    FAMILY HISTORY:    REVIEW OF SYSTEMS:  CONSTITUTIONAL: No fever, weight loss, or fatigue  EYES: No eye pain, visual disturbances, or discharge  ENMT:  No difficulty hearing, tinnitus, vertigo; No sinus or throat pain  NECK: No pain or stiffness  RESPIRATORY: No cough, wheezing, chills or hemoptysis; No shortness of breath  CARDIOVASCULAR: No chest pain, palpitations, dizziness, or leg swelling  GASTROINTESTINAL: No abdominal or epigastric pain. No nausea, vomiting, or hematemesis; No diarrhea or constipation. No melena or hematochezia.  GENITOURINARY: No dysuria, frequency, hematuria, or incontinence  NEUROLOGICAL: No headaches, memory loss, loss of strength, numbness, or tremors  SKIN: No itching, burning, rashes, or lesions   LYMPH NODES: No enlarged glands  ENDOCRINE: No heat or cold intolerance; No hair loss  MUSCULOSKELETAL: No joint pain or swelling; No muscle, back, or extremity pain  PSYCHIATRIC: No depression, anxiety, mood swings, or difficulty sleeping  HEME/LYMPH: No easy bruising, or bleeding gums  ALLERY AND IMMUNOLOGIC: No hives or eczema    RADIOLOGY & ADDITIONAL TESTS:    Imaging Personally Reviewed:  [ ] YES  [ ] NO    Consultant(s) Notes Reviewed:  [ ] YES  [ ] NO    PHYSICAL EXAM:  GENERAL: NAD, well-groomed, well-developed  HEAD:  Atraumatic, Normocephalic  EYES: EOMI, PERRLA, conjunctiva and sclera clear  ENMT: No tonsillar erythema, exudates, or enlargement; Moist mucous membranes, Good dentition, No lesions  NECK: Supple, No JVD, Normal thyroid  NERVOUS SYSTEM:  Alert & Oriented X3, Good concentration; Motor Strength 5/5 B/L upper and lower extremities; DTRs 2+ intact and symmetric  CHEST/LUNG: Clear to percussion bilaterally; No rales, rhonchi, wheezing, or rubs  HEART: Regular rate and rhythm; No murmurs, rubs, or gallops  ABDOMEN: Soft, Nontender, Nondistended; Bowel sounds present  EXTREMITIES:  2+ Peripheral Pulses, No clubbing, cyanosis, or edema  LYMPH: No lymphadenopathy noted  SKIN: No rashes or lesions    LABS:                        12.6   4.81  )-----------( 163      ( 29 Dec 2021 06:08 )             38.6     12-29    136  |  103  |  34<H>  ----------------------------<  266<H>  4.9   |  21<L>  |  2.06<H>    Ca    9.2      29 Dec 2021 06:08    TPro  6.5  /  Alb  3.9  /  TBili  0.4  /  DBili  x   /  AST  13  /  ALT  16  /  AlkPhos  59  12-28        CAPILLARY BLOOD GLUCOSE      POCT Blood Glucose.: 171 mg/dL (29 Dec 2021 21:01)  POCT Blood Glucose.: 107 mg/dL (29 Dec 2021 15:43)  POCT Blood Glucose.: 156 mg/dL (29 Dec 2021 14:49)  POCT Blood Glucose.: 168 mg/dL (29 Dec 2021 08:05)  POCT Blood Glucose.: 139 mg/dL (29 Dec 2021 02:30)            MEDICATIONS  (STANDING):  aspirin  chewable 81 milliGRAM(s) Oral daily  ATENolol  Tablet 50 milliGRAM(s) Oral daily  dextrose 40% Gel 15 Gram(s) Oral once  dextrose 5%. 1000 milliLiter(s) (50 mL/Hr) IV Continuous <Continuous>  dextrose 5%. 1000 milliLiter(s) (100 mL/Hr) IV Continuous <Continuous>  dextrose 50% Injectable 12.5 Gram(s) IV Push once  dextrose 50% Injectable 25 Gram(s) IV Push once  dextrose 50% Injectable 25 Gram(s) IV Push once  glucagon  Injectable 1 milliGRAM(s) IntraMuscular once  heparin   Injectable 5000 Unit(s) SubCutaneous every 8 hours  insulin glargine Injectable (LANTUS) 18 Unit(s) SubCutaneous at bedtime  insulin lispro (ADMELOG) corrective regimen sliding scale   SubCutaneous at bedtime  insulin lispro (ADMELOG) corrective regimen sliding scale   SubCutaneous three times a day before meals  sodium chloride 0.9% Bolus 400 milliLiter(s) IV Bolus once    MEDICATIONS  (PRN):  acetaminophen     Tablet .. 650 milliGRAM(s) Oral every 6 hours PRN Temp greater or equal to 38C (100.4F), Mild Pain (1 - 3)  melatonin 3 milliGRAM(s) Oral at bedtime PRN Insomnia      Care Discussed with Consultants/Other Providers [ ] YES  [ ] NO Patient is a 66y old  Male who presents with a chief complaint of chest pain, positive stress test (28 Dec 2021 22:02)      INTERVAL HPI/OVERNIGHT EVENTS: s/p cardiac cath today   T(C): 36.6 (12-29-21 @ 20:10), Max: 36.6 (12-29-21 @ 11:49)  HR: 62 (12-29-21 @ 20:10) (55 - 77)  BP: 135/69 (12-29-21 @ 20:10) (104/60 - 135/69)  RR: 17 (12-29-21 @ 20:10) (16 - 19)  SpO2: 97% (12-29-21 @ 20:10) (92% - 99%)  Wt(kg): --  I&O's Summary    29 Dec 2021 07:01  -  29 Dec 2021 23:35  --------------------------------------------------------  IN: 400 mL / OUT: 0 mL / NET: 400 mL        PAST MEDICAL & SURGICAL HISTORY:  HTN (hypertension)    HLD (hyperlipidemia)    T2DM (type 2 diabetes mellitus)    Diabetes mellitus    Dyslipidemia    Hypertension    No significant past surgical history    No significant past surgical history        SOCIAL HISTORY  Alcohol:  Tobacco:  Illicit substance use:    FAMILY HISTORY:    REVIEW OF SYSTEMS:  CONSTITUTIONAL: No fever, weight loss, or fatigue  EYES: No eye pain, visual disturbances, or discharge  ENMT:  No difficulty hearing, tinnitus, vertigo; No sinus or throat pain  NECK: No pain or stiffness  RESPIRATORY: No cough, wheezing, chills or hemoptysis; No shortness of breath  CARDIOVASCULAR: No chest pain, palpitations, dizziness, or leg swelling  GASTROINTESTINAL: No abdominal or epigastric pain. No nausea, vomiting, or hematemesis; No diarrhea or constipation. No melena or hematochezia.  GENITOURINARY: No dysuria, frequency, hematuria, or incontinence  NEUROLOGICAL: No headaches, memory loss, loss of strength, numbness, or tremors  SKIN: No itching, burning, rashes, or lesions   LYMPH NODES: No enlarged glands  ENDOCRINE: No heat or cold intolerance; No hair loss  MUSCULOSKELETAL: No joint pain or swelling; No muscle, back, or extremity pain  PSYCHIATRIC: No depression, anxiety, mood swings, or difficulty sleeping  HEME/LYMPH: No easy bruising, or bleeding gums  ALLERY AND IMMUNOLOGIC: No hives or eczema    RADIOLOGY & ADDITIONAL TESTS:    Imaging Personally Reviewed:  [ ] YES  [ ] NO    Consultant(s) Notes Reviewed:  [ ] YES  [ ] NO    PHYSICAL EXAM:  GENERAL: NAD, well-groomed, well-developed  HEAD:  Atraumatic, Normocephalic  EYES: EOMI, PERRLA, conjunctiva and sclera clear  ENMT: No tonsillar erythema, exudates, or enlargement; Moist mucous membranes, Good dentition, No lesions  NECK: Supple, No JVD, Normal thyroid  NERVOUS SYSTEM:  Alert & Oriented X3, Good concentration; Motor Strength 5/5 B/L upper and lower extremities; DTRs 2+ intact and symmetric  CHEST/LUNG: Clear to percussion bilaterally; No rales, rhonchi, wheezing, or rubs  HEART: Regular rate and rhythm; No murmurs, rubs, or gallops  ABDOMEN: Soft, Nontender, Nondistended; Bowel sounds present  EXTREMITIES:  2+ Peripheral Pulses, No clubbing, cyanosis, or edema  LYMPH: No lymphadenopathy noted  SKIN: No rashes or lesions    LABS:                        12.6   4.81  )-----------( 163      ( 29 Dec 2021 06:08 )             38.6     12-29    136  |  103  |  34<H>  ----------------------------<  266<H>  4.9   |  21<L>  |  2.06<H>    Ca    9.2      29 Dec 2021 06:08    TPro  6.5  /  Alb  3.9  /  TBili  0.4  /  DBili  x   /  AST  13  /  ALT  16  /  AlkPhos  59  12-28        CAPILLARY BLOOD GLUCOSE      POCT Blood Glucose.: 171 mg/dL (29 Dec 2021 21:01)  POCT Blood Glucose.: 107 mg/dL (29 Dec 2021 15:43)  POCT Blood Glucose.: 156 mg/dL (29 Dec 2021 14:49)  POCT Blood Glucose.: 168 mg/dL (29 Dec 2021 08:05)  POCT Blood Glucose.: 139 mg/dL (29 Dec 2021 02:30)            MEDICATIONS  (STANDING):  aspirin  chewable 81 milliGRAM(s) Oral daily  ATENolol  Tablet 50 milliGRAM(s) Oral daily  dextrose 40% Gel 15 Gram(s) Oral once  dextrose 5%. 1000 milliLiter(s) (50 mL/Hr) IV Continuous <Continuous>  dextrose 5%. 1000 milliLiter(s) (100 mL/Hr) IV Continuous <Continuous>  dextrose 50% Injectable 12.5 Gram(s) IV Push once  dextrose 50% Injectable 25 Gram(s) IV Push once  dextrose 50% Injectable 25 Gram(s) IV Push once  glucagon  Injectable 1 milliGRAM(s) IntraMuscular once  heparin   Injectable 5000 Unit(s) SubCutaneous every 8 hours  insulin glargine Injectable (LANTUS) 18 Unit(s) SubCutaneous at bedtime  insulin lispro (ADMELOG) corrective regimen sliding scale   SubCutaneous at bedtime  insulin lispro (ADMELOG) corrective regimen sliding scale   SubCutaneous three times a day before meals  sodium chloride 0.9% Bolus 400 milliLiter(s) IV Bolus once    MEDICATIONS  (PRN):  acetaminophen     Tablet .. 650 milliGRAM(s) Oral every 6 hours PRN Temp greater or equal to 38C (100.4F), Mild Pain (1 - 3)  melatonin 3 milliGRAM(s) Oral at bedtime PRN Insomnia      Care Discussed with Consultants/Other Providers [ ] YES  [ ] NO

## 2021-12-30 LAB
ANION GAP SERPL CALC-SCNC: 14 MMOL/L — SIGNIFICANT CHANGE UP (ref 5–17)
BUN SERPL-MCNC: 33 MG/DL — HIGH (ref 7–23)
CALCIUM SERPL-MCNC: 9.8 MG/DL — SIGNIFICANT CHANGE UP (ref 8.4–10.5)
CHLORIDE SERPL-SCNC: 102 MMOL/L — SIGNIFICANT CHANGE UP (ref 96–108)
CO2 SERPL-SCNC: 21 MMOL/L — LOW (ref 22–31)
CREAT SERPL-MCNC: 1.82 MG/DL — HIGH (ref 0.5–1.3)
GLUCOSE BLDC GLUCOMTR-MCNC: 100 MG/DL — HIGH (ref 70–99)
GLUCOSE BLDC GLUCOMTR-MCNC: 109 MG/DL — HIGH (ref 70–99)
GLUCOSE BLDC GLUCOMTR-MCNC: 189 MG/DL — HIGH (ref 70–99)
GLUCOSE BLDC GLUCOMTR-MCNC: 331 MG/DL — HIGH (ref 70–99)
GLUCOSE BLDC GLUCOMTR-MCNC: 77 MG/DL — SIGNIFICANT CHANGE UP (ref 70–99)
GLUCOSE BLDC GLUCOMTR-MCNC: 85 MG/DL — SIGNIFICANT CHANGE UP (ref 70–99)
GLUCOSE SERPL-MCNC: 95 MG/DL — SIGNIFICANT CHANGE UP (ref 70–99)
HCT VFR BLD CALC: 41.6 % — SIGNIFICANT CHANGE UP (ref 39–50)
HGB BLD-MCNC: 13.5 G/DL — SIGNIFICANT CHANGE UP (ref 13–17)
MCHC RBC-ENTMCNC: 28 PG — SIGNIFICANT CHANGE UP (ref 27–34)
MCHC RBC-ENTMCNC: 32.5 GM/DL — SIGNIFICANT CHANGE UP (ref 32–36)
MCV RBC AUTO: 86.1 FL — SIGNIFICANT CHANGE UP (ref 80–100)
NRBC # BLD: 0 /100 WBCS — SIGNIFICANT CHANGE UP (ref 0–0)
PLATELET # BLD AUTO: 184 K/UL — SIGNIFICANT CHANGE UP (ref 150–400)
POTASSIUM SERPL-MCNC: 4.6 MMOL/L — SIGNIFICANT CHANGE UP (ref 3.5–5.3)
POTASSIUM SERPL-SCNC: 4.6 MMOL/L — SIGNIFICANT CHANGE UP (ref 3.5–5.3)
RBC # BLD: 4.83 M/UL — SIGNIFICANT CHANGE UP (ref 4.2–5.8)
RBC # FLD: 12.4 % — SIGNIFICANT CHANGE UP (ref 10.3–14.5)
SODIUM SERPL-SCNC: 137 MMOL/L — SIGNIFICANT CHANGE UP (ref 135–145)
WBC # BLD: 4.32 K/UL — SIGNIFICANT CHANGE UP (ref 3.8–10.5)
WBC # FLD AUTO: 4.32 K/UL — SIGNIFICANT CHANGE UP (ref 3.8–10.5)

## 2021-12-30 PROCEDURE — 99152 MOD SED SAME PHYS/QHP 5/>YRS: CPT

## 2021-12-30 PROCEDURE — 92928 PRQ TCAT PLMT NTRAC ST 1 LES: CPT | Mod: LD

## 2021-12-30 PROCEDURE — 93010 ELECTROCARDIOGRAM REPORT: CPT

## 2021-12-30 PROCEDURE — 92921: CPT | Mod: LD

## 2021-12-30 RX ADMIN — CLOPIDOGREL BISULFATE 75 MILLIGRAM(S): 75 TABLET, FILM COATED ORAL at 11:57

## 2021-12-30 RX ADMIN — Medication 81 MILLIGRAM(S): at 11:57

## 2021-12-30 RX ADMIN — Medication 650 MILLIGRAM(S): at 19:21

## 2021-12-30 RX ADMIN — Medication 650 MILLIGRAM(S): at 19:50

## 2021-12-30 RX ADMIN — ATENOLOL 50 MILLIGRAM(S): 25 TABLET ORAL at 05:44

## 2021-12-30 RX ADMIN — INSULIN GLARGINE 18 UNIT(S): 100 INJECTION, SOLUTION SUBCUTANEOUS at 21:16

## 2021-12-30 RX ADMIN — Medication 2: at 21:19

## 2021-12-30 RX ADMIN — Medication 1: at 18:45

## 2021-12-30 RX ADMIN — HEPARIN SODIUM 5000 UNIT(S): 5000 INJECTION INTRAVENOUS; SUBCUTANEOUS at 21:17

## 2021-12-30 NOTE — PROGRESS NOTE ADULT - SUBJECTIVE AND OBJECTIVE BOX
SUBJ:    Home Medications:  aspirin 81 mg oral tablet: 1 tab(s) orally once a day (28 Dec 2021 22:09)  atenolol 50 mg oral tablet: 1 tab(s) orally once a day (28 Dec 2021 22:09)  Lantus 100 units/mL subcutaneous solution: 22 unit(s) subcutaneous once a day (at bedtime) (28 Dec 2021 22:09)  lisinopril 20 mg oral tablet: 1 tab(s) orally once a day (28 Dec 2021 22:09)  metFORMIN 750 mg oral tablet, extended release: 1 tab(s) orally once a day (28 Dec 2021 22:09)      MEDICATIONS  (STANDING):  aspirin  chewable 81 milliGRAM(s) Oral daily  ATENolol  Tablet 50 milliGRAM(s) Oral daily  clopidogrel Tablet 75 milliGRAM(s) Oral daily  dextrose 40% Gel 15 Gram(s) Oral once  dextrose 5%. 1000 milliLiter(s) (50 mL/Hr) IV Continuous <Continuous>  dextrose 5%. 1000 milliLiter(s) (100 mL/Hr) IV Continuous <Continuous>  dextrose 50% Injectable 12.5 Gram(s) IV Push once  dextrose 50% Injectable 25 Gram(s) IV Push once  dextrose 50% Injectable 25 Gram(s) IV Push once  glucagon  Injectable 1 milliGRAM(s) IntraMuscular once  heparin   Injectable 5000 Unit(s) SubCutaneous every 8 hours  insulin glargine Injectable (LANTUS) 18 Unit(s) SubCutaneous at bedtime  insulin lispro (ADMELOG) corrective regimen sliding scale   SubCutaneous at bedtime  insulin lispro (ADMELOG) corrective regimen sliding scale   SubCutaneous three times a day before meals  sodium chloride 0.9% Bolus 400 milliLiter(s) IV Bolus once    MEDICATIONS  (PRN):  acetaminophen     Tablet .. 650 milliGRAM(s) Oral every 6 hours PRN Temp greater or equal to 38C (100.4F), Mild Pain (1 - 3)  melatonin 3 milliGRAM(s) Oral at bedtime PRN Insomnia      Vital Signs Last 24 Hrs  T(C): 36.8 (30 Dec 2021 18:31), Max: 36.8 (30 Dec 2021 18:31)  T(F): 98.2 (30 Dec 2021 18:31), Max: 98.2 (30 Dec 2021 18:31)  HR: 60 (30 Dec 2021 18:31) (53 - 66)  BP: 155/71 (30 Dec 2021 18:31) (125/71 - 165/74)  BP(mean): --  RR: 18 (30 Dec 2021 18:31) (17 - 18)  SpO2: 96% (30 Dec 2021 18:31) (94% - 99%)    REVIEW OF SYSTEMS:  As per HPI, otherwise unremarkable.     PHYSICAL EXAM:  Constitutional/Appearance: Normal, Well-developed  HEENT:   Normal oral mucosa, no drainage or redness, supple neck  Lymphatic: No lymphadenopathy  Cardiovascular: Normal S1 S2, No edema, RRR  Respiratory: Lungs clear to auscultation, respirations non-labored  Psychiatry: A & O x 3, appropriate affect.   Gastrointestinal:  Soft, Non-tender, no distention  Skin: No rashes, No ecchymoses, No cyanosis	  Neurologic: Non-focal, Alert and oriented x 3  Extremities: Normal range of motion  Vascular: Peripheral pulses palpable 2+ bilaterally (radial)    LABS:  CBC Full  -  ( 30 Dec 2021 07:31 )  WBC Count : 4.32 K/uL  RBC Count : 4.83 M/uL  Hemoglobin : 13.5 g/dL  Hematocrit : 41.6 %  Platelet Count - Automated : 184 K/uL  Mean Cell Volume : 86.1 fl  Mean Cell Hemoglobin : 28.0 pg  Mean Cell Hemoglobin Concentration : 32.5 gm/dL  Auto Neutrophil # : x  Auto Lymphocyte # : x  Auto Monocyte # : x  Auto Eosinophil # : x  Auto Basophil # : x  Auto Neutrophil % : x  Auto Lymphocyte % : x  Auto Monocyte % : x  Auto Eosinophil % : x  Auto Basophil % : x      12-30    137  |  102  |  33<H>  ----------------------------<  95  4.6   |  21<L>  |  1.82<H>    Ca    9.8      30 Dec 2021 07:31              RADIOLOGY & ADDITIONAL STUDIES:  TELEMETRY:  ECG:  TTE:    IMPRESSION AND PLAN:    ***    Zenon Yarbrough MD, MPH, YASH, RPVI, [facility]  Inpatient Cardiovascular Specialist; [facility], [facility] ([facility])  ; [employer] at [facility]  message: [employer] [phone] or [employer] Teams (text preferred and/or call)  email: [email or web address] on-service contact/call information, go to [email or web address] and use "[email or web address]" to login.  Outpatient Cardiology appointments, call  [phone] to arrange with a colleague; I do not have outpatient Cardiology clinic.

## 2021-12-30 NOTE — CONSULT NOTE ADULT - ASSESSMENT
67 yo male insulin dependent DM type 2, HTN presents with unstable angina admitted with positive stress test s/p cath    1) BESSY on CKD stage 3  Pt cannot recall his cr but says his egfr runs around 40 to 50%  check ua  check urine pro/cr  check renal US  s/p Contrast-> may see a rise in cr  will monitor off ivf for now  encourage po intake  avoid nephrotoxins  trend bmp    Dr Pradhan  [phone]

## 2021-12-30 NOTE — PROGRESS NOTE ADULT - SUBJECTIVE AND OBJECTIVE BOX
Patient is a 66y old  Male who presents with a chief complaint of chest pain, positive stress test (30 Dec 2021 19:22)      INTERVAL HPI/OVERNIGHT EVENTS:  T(C): 36.6 (12-31-21 @ 03:30), Max: 36.8 (12-30-21 @ 18:31)  HR: 80 (12-31-21 @ 03:30) (53 - 80)  BP: 170/90 (12-31-21 @ 03:30) (135/77 - 170/90)  RR: 18 (12-31-21 @ 03:30) (17 - 18)  SpO2: 96% (12-31-21 @ 03:30) (94% - 99%)  Wt(kg): --  I&O's Summary    29 Dec 2021 07:01  -  30 Dec 2021 07:00  --------------------------------------------------------  IN: 400 mL / OUT: 0 mL / NET: 400 mL    30 Dec 2021 07:01  -  31 Dec 2021 04:07  --------------------------------------------------------  IN: 0 mL / OUT: 0 mL / NET: 0 mL        PAST MEDICAL & SURGICAL HISTORY:  HTN (hypertension)    HLD (hyperlipidemia)    T2DM (type 2 diabetes mellitus)    Diabetes mellitus    Dyslipidemia    Hypertension    No significant past surgical history    No significant past surgical history        SOCIAL HISTORY  Alcohol:  Tobacco:  Illicit substance use:    FAMILY HISTORY:    REVIEW OF SYSTEMS:  CONSTITUTIONAL: No fever, weight loss, or fatigue  EYES: No eye pain, visual disturbances, or discharge  ENMT:  No difficulty hearing, tinnitus, vertigo; No sinus or throat pain  NECK: No pain or stiffness  RESPIRATORY: No cough, wheezing, chills or hemoptysis; No shortness of breath  CARDIOVASCULAR: No chest pain, palpitations, dizziness, or leg swelling  GASTROINTESTINAL: No abdominal or epigastric pain. No nausea, vomiting, or hematemesis; No diarrhea or constipation. No melena or hematochezia.  GENITOURINARY: No dysuria, frequency, hematuria, or incontinence  NEUROLOGICAL: No headaches, memory loss, loss of strength, numbness, or tremors  SKIN: No itching, burning, rashes, or lesions   LYMPH NODES: No enlarged glands  ENDOCRINE: No heat or cold intolerance; No hair loss  MUSCULOSKELETAL: No joint pain or swelling; No muscle, back, or extremity pain  PSYCHIATRIC: No depression, anxiety, mood swings, or difficulty sleeping  HEME/LYMPH: No easy bruising, or bleeding gums  ALLERY AND IMMUNOLOGIC: No hives or eczema    RADIOLOGY & ADDITIONAL TESTS:    Imaging Personally Reviewed:  [ ] YES  [ ] NO    Consultant(s) Notes Reviewed:  [ ] YES  [ ] NO    PHYSICAL EXAM:  GENERAL: NAD, well-groomed, well-developed  HEAD:  Atraumatic, Normocephalic  EYES: EOMI, PERRLA, conjunctiva and sclera clear  ENMT: No tonsillar erythema, exudates, or enlargement; Moist mucous membranes, Good dentition, No lesions  NECK: Supple, No JVD, Normal thyroid  NERVOUS SYSTEM:  Alert & Oriented X3, Good concentration; Motor Strength 5/5 B/L upper and lower extremities; DTRs 2+ intact and symmetric  CHEST/LUNG: Clear to percussion bilaterally; No rales, rhonchi, wheezing, or rubs  HEART: Regular rate and rhythm; No murmurs, rubs, or gallops  ABDOMEN: Soft, Nontender, Nondistended; Bowel sounds present  EXTREMITIES:  2+ Peripheral Pulses, No clubbing, cyanosis, or edema  LYMPH: No lymphadenopathy noted  SKIN: No rashes or lesions    LABS:                        13.5   4.32  )-----------( 184      ( 30 Dec 2021 07:31 )             41.6     12-30    137  |  102  |  33<H>  ----------------------------<  95  4.6   |  21<L>  |  1.82<H>    Ca    9.8      30 Dec 2021 07:31          CAPILLARY BLOOD GLUCOSE      POCT Blood Glucose.: 331 mg/dL (30 Dec 2021 21:11)  POCT Blood Glucose.: 189 mg/dL (30 Dec 2021 18:40)  POCT Blood Glucose.: 109 mg/dL (30 Dec 2021 15:41)  POCT Blood Glucose.: 77 mg/dL (30 Dec 2021 14:08)  POCT Blood Glucose.: 85 mg/dL (30 Dec 2021 11:18)  POCT Blood Glucose.: 100 mg/dL (30 Dec 2021 07:29)            MEDICATIONS  (STANDING):  aspirin  chewable 81 milliGRAM(s) Oral daily  ATENolol  Tablet 50 milliGRAM(s) Oral daily  clopidogrel Tablet 75 milliGRAM(s) Oral daily  dextrose 40% Gel 15 Gram(s) Oral once  dextrose 5%. 1000 milliLiter(s) (50 mL/Hr) IV Continuous <Continuous>  dextrose 5%. 1000 milliLiter(s) (100 mL/Hr) IV Continuous <Continuous>  dextrose 50% Injectable 12.5 Gram(s) IV Push once  dextrose 50% Injectable 25 Gram(s) IV Push once  dextrose 50% Injectable 25 Gram(s) IV Push once  glucagon  Injectable 1 milliGRAM(s) IntraMuscular once  heparin   Injectable 5000 Unit(s) SubCutaneous every 8 hours  insulin glargine Injectable (LANTUS) 18 Unit(s) SubCutaneous at bedtime  insulin lispro (ADMELOG) corrective regimen sliding scale   SubCutaneous at bedtime  insulin lispro (ADMELOG) corrective regimen sliding scale   SubCutaneous three times a day before meals  sodium chloride 0.9% Bolus 400 milliLiter(s) IV Bolus once    MEDICATIONS  (PRN):  acetaminophen     Tablet .. 650 milliGRAM(s) Oral every 6 hours PRN Temp greater or equal to 38C (100.4F), Mild Pain (1 - 3)  melatonin 3 milliGRAM(s) Oral at bedtime PRN Insomnia      Care Discussed with Consultants/Other Providers [ ] YES  [ ] NO Patient is a 66y old  Male who presents with a chief complaint of chest pain, positive stress test (30 Dec 2021 19:22)      INTERVAL HPI/OVERNIGHT EVENTS: s/p cardiac angio with stent placement     T(C): 36.6 (12-31-21 @ 03:30), Max: 36.8 (12-30-21 @ 18:31)  HR: 80 (12-31-21 @ 03:30) (53 - 80)  BP: 170/90 (12-31-21 @ 03:30) (135/77 - 170/90)  RR: 18 (12-31-21 @ 03:30) (17 - 18)  SpO2: 96% (12-31-21 @ 03:30) (94% - 99%)  Wt(kg): --  I&O's Summary    29 Dec 2021 07:01  -  30 Dec 2021 07:00  --------------------------------------------------------  IN: 400 mL / OUT: 0 mL / NET: 400 mL    30 Dec 2021 07:01  -  31 Dec 2021 04:07  --------------------------------------------------------  IN: 0 mL / OUT: 0 mL / NET: 0 mL        PAST MEDICAL & SURGICAL HISTORY:  HTN (hypertension)    HLD (hyperlipidemia)    T2DM (type 2 diabetes mellitus)    Diabetes mellitus    Dyslipidemia    Hypertension    No significant past surgical history    No significant past surgical history        SOCIAL HISTORY  Alcohol:  Tobacco:  Illicit substance use:    FAMILY HISTORY:    REVIEW OF SYSTEMS:  CONSTITUTIONAL: No fever, weight loss, or fatigue  EYES: No eye pain, visual disturbances, or discharge  ENMT:  No difficulty hearing, tinnitus, vertigo; No sinus or throat pain  NECK: No pain or stiffness  RESPIRATORY: No cough, wheezing, chills or hemoptysis; No shortness of breath  CARDIOVASCULAR: No chest pain, palpitations, dizziness, or leg swelling  GASTROINTESTINAL: No abdominal or epigastric pain. No nausea, vomiting, or hematemesis; No diarrhea or constipation. No melena or hematochezia.  GENITOURINARY: No dysuria, frequency, hematuria, or incontinence  NEUROLOGICAL: No headaches, memory loss, loss of strength, numbness, or tremors  SKIN: No itching, burning, rashes, or lesions   LYMPH NODES: No enlarged glands  ENDOCRINE: No heat or cold intolerance; No hair loss  MUSCULOSKELETAL: No joint pain or swelling; No muscle, back, or extremity pain  PSYCHIATRIC: No depression, anxiety, mood swings, or difficulty sleeping  HEME/LYMPH: No easy bruising, or bleeding gums  ALLERY AND IMMUNOLOGIC: No hives or eczema    RADIOLOGY & ADDITIONAL TESTS:    Imaging Personally Reviewed:  [ ] YES  [ ] NO    Consultant(s) Notes Reviewed:  [ ] YES  [ ] NO    PHYSICAL EXAM:  GENERAL: NAD, well-groomed, well-developed  HEAD:  Atraumatic, Normocephalic  EYES: EOMI, PERRLA, conjunctiva and sclera clear  ENMT: No tonsillar erythema, exudates, or enlargement; Moist mucous membranes, Good dentition, No lesions  NECK: Supple, No JVD, Normal thyroid  NERVOUS SYSTEM:  Alert & Oriented X3, Good concentration; Motor Strength 5/5 B/L upper and lower extremities; DTRs 2+ intact and symmetric  CHEST/LUNG: Clear to percussion bilaterally; No rales, rhonchi, wheezing, or rubs  HEART: Regular rate and rhythm; No murmurs, rubs, or gallops  ABDOMEN: Soft, Nontender, Nondistended; Bowel sounds present  EXTREMITIES:  2+ Peripheral Pulses, No clubbing, cyanosis, or edema  LYMPH: No lymphadenopathy noted  SKIN: No rashes or lesions    LABS:                        13.5   4.32  )-----------( 184      ( 30 Dec 2021 07:31 )             41.6     12-30    137  |  102  |  33<H>  ----------------------------<  95  4.6   |  21<L>  |  1.82<H>    Ca    9.8      30 Dec 2021 07:31          CAPILLARY BLOOD GLUCOSE      POCT Blood Glucose.: 331 mg/dL (30 Dec 2021 21:11)  POCT Blood Glucose.: 189 mg/dL (30 Dec 2021 18:40)  POCT Blood Glucose.: 109 mg/dL (30 Dec 2021 15:41)  POCT Blood Glucose.: 77 mg/dL (30 Dec 2021 14:08)  POCT Blood Glucose.: 85 mg/dL (30 Dec 2021 11:18)  POCT Blood Glucose.: 100 mg/dL (30 Dec 2021 07:29)            MEDICATIONS  (STANDING):  aspirin  chewable 81 milliGRAM(s) Oral daily  ATENolol  Tablet 50 milliGRAM(s) Oral daily  clopidogrel Tablet 75 milliGRAM(s) Oral daily  dextrose 40% Gel 15 Gram(s) Oral once  dextrose 5%. 1000 milliLiter(s) (50 mL/Hr) IV Continuous <Continuous>  dextrose 5%. 1000 milliLiter(s) (100 mL/Hr) IV Continuous <Continuous>  dextrose 50% Injectable 12.5 Gram(s) IV Push once  dextrose 50% Injectable 25 Gram(s) IV Push once  dextrose 50% Injectable 25 Gram(s) IV Push once  glucagon  Injectable 1 milliGRAM(s) IntraMuscular once  heparin   Injectable 5000 Unit(s) SubCutaneous every 8 hours  insulin glargine Injectable (LANTUS) 18 Unit(s) SubCutaneous at bedtime  insulin lispro (ADMELOG) corrective regimen sliding scale   SubCutaneous at bedtime  insulin lispro (ADMELOG) corrective regimen sliding scale   SubCutaneous three times a day before meals  sodium chloride 0.9% Bolus 400 milliLiter(s) IV Bolus once    MEDICATIONS  (PRN):  acetaminophen     Tablet .. 650 milliGRAM(s) Oral every 6 hours PRN Temp greater or equal to 38C (100.4F), Mild Pain (1 - 3)  melatonin 3 milliGRAM(s) Oral at bedtime PRN Insomnia      Care Discussed with Consultants/Other Providers [ ] YES  [ ] NO

## 2021-12-30 NOTE — CONSULT NOTE ADULT - SUBJECTIVE AND OBJECTIVE BOX
[facility] Consult Note Nephrology - CONSULTATION NOTE    66y Male patient with past medical history of HTN, on lisinopril, DM on metformin and lantus presenting with atypical chest pain, dull and intermittent but ongoing for several days, not relieved with rest and not particularly exacerbated with exertion. No prior heart disease. Upon presentation, ECG non-ischemic, cardiac enzymes negative. Chest pain somewhat relieved with morphine and found no improvement with GI cocktail x2. Nuclear stress test performed with ischemia in the distal anterior walls associated with WMA, as well as basal inferior, inferolateral walls suggestive of infarct with mild jeison-infarct ischemia. ECHO with preserved EF, no WMA and overall benign. Cardiology consulted for further management     Renal consult for CKD stage 3  Per pt his egfr runs around 40 to 50%.  HE does not know his cr.  Has seen a nephrologist in [facility] [place] [facility] many years ago but says he does not remember the name  currently denies any f/c/n/v/d/c/    PAST MEDICAL & SURGICAL HISTORY:  HTN (hypertension)    HLD (hyperlipidemia)    T2DM (type 2 diabetes mellitus)    Diabetes mellitus    Dyslipidemia    Hypertension    No significant past surgical history    No significant past surgical history      No Known Allergies    Home Medications Reviewed  Hospital Medications:   MEDICATIONS  (STANDING):  aspirin  chewable 81 milliGRAM(s) Oral daily  ATENolol  Tablet 50 milliGRAM(s) Oral daily  clopidogrel Tablet 75 milliGRAM(s) Oral daily  dextrose 40% Gel 15 Gram(s) Oral once  dextrose 5%. 1000 milliLiter(s) (50 mL/Hr) IV Continuous <Continuous>  dextrose 5%. 1000 milliLiter(s) (100 mL/Hr) IV Continuous <Continuous>  dextrose 50% Injectable 25 Gram(s) IV Push once  dextrose 50% Injectable 25 Gram(s) IV Push once  dextrose 50% Injectable 12.5 Gram(s) IV Push once  glucagon  Injectable 1 milliGRAM(s) IntraMuscular once  heparin   Injectable 5000 Unit(s) SubCutaneous every 8 hours  insulin glargine Injectable (LANTUS) 18 Unit(s) SubCutaneous at bedtime  insulin lispro (ADMELOG) corrective regimen sliding scale   SubCutaneous three times a day before meals  insulin lispro (ADMELOG) corrective regimen sliding scale   SubCutaneous at bedtime  sodium chloride 0.9% Bolus 400 milliLiter(s) IV Bolus once    SOCIAL HISTORY:  Denies ETOh,Smoking,   FAMILY HISTORY:  No pertinent family history in first degree relatives      REVIEW OF SYSTEMS:  CONSTITUTIONAL: No weakness, fevers or chills  EYES/ENT: No visual changes;  No vertigo or throat pain   NECK: No pain or stiffness  RESPIRATORY: No cough, wheezing, hemoptysis; No shortness of breath  CARDIOVASCULAR: No chest pain or palpitations.  GASTROINTESTINAL: No abdominal or epigastric pain. No nausea, vomiting, or hematemesis; No diarrhea or constipation. No melena or hematochezia.  GENITOURINARY: No dysuria, frequency, foamy urine, urinary urgency, incontinence or hematuria  NEUROLOGICAL: No numbness or weakness  SKIN: No itching, burning, rashes, or lesions   VASCULAR: No bilateral lower extremity edema.   All other review of systems is negative unless indicated above.    VITALS:  T(F): 98.1 (12-30-21 @ 04:23), Max: 98.1 (12-30-21 @ 04:23)  HR: 59 (12-30-21 @ 04:23)  BP: 154/79 (12-30-21 @ 04:23)  RR: 18 (12-30-21 @ 04:23)  SpO2: 99% (12-30-21 @ 04:23)  Wt(kg): --    12-29 @ 07:01  -  12-30 @ 07:00  --------------------------------------------------------  IN: 400 mL / OUT: 0 mL / NET: 400 mL        PHYSICAL EXAM:  Constitutional: NAD  HEENT: anicteric sclera, oropharynx clear, MMM  Neck: No JVD  Respiratory: CTAB, no wheezes, rales or rhonchi  Cardiovascular: S1, S2, RRR  Gastrointestinal: BS+, soft, NT/ND  Extremities: No cyanosis or clubbing. No peripheral edema  Neurological: A/O x 3, no focal deficits  Psychiatric: Normal mood, normal affect  : No CVA tenderness. No sahni.   Skin: No rashes      LABS:  12-30    137  |  102  |  33<H>  ----------------------------<  95  4.6   |  21<L>  |  1.82<H>    Ca    9.8      30 Dec 2021 07:31      Creatinine Trend: 1.82 <--, 2.06 <--, 1.94 <--, 1.99 <--                        13.5   4.32  )-----------( 184      ( 30 Dec 2021 07:31 )             41.6     Urine Studies:      RADIOLOGY & ADDITIONAL STUDIES:

## 2021-12-31 LAB
ANION GAP SERPL CALC-SCNC: 14 MMOL/L — SIGNIFICANT CHANGE UP (ref 5–17)
APPEARANCE UR: CLEAR — SIGNIFICANT CHANGE UP
BACTERIA # UR AUTO: NEGATIVE — SIGNIFICANT CHANGE UP
BILIRUB UR-MCNC: NEGATIVE — SIGNIFICANT CHANGE UP
BUN SERPL-MCNC: 28 MG/DL — HIGH (ref 7–23)
CALCIUM SERPL-MCNC: 9.5 MG/DL — SIGNIFICANT CHANGE UP (ref 8.4–10.5)
CHLORIDE SERPL-SCNC: 99 MMOL/L — SIGNIFICANT CHANGE UP (ref 96–108)
CHLORIDE UR-SCNC: 91 MMOL/L — SIGNIFICANT CHANGE UP
CK MB BLD-MCNC: 10.6 % — HIGH (ref 0–3.5)
CK MB CFR SERPL CALC: 49.3 NG/ML — HIGH (ref 0–6.7)
CK SERPL-CCNC: 463 U/L — HIGH (ref 30–200)
CO2 SERPL-SCNC: 20 MMOL/L — LOW (ref 22–31)
COLOR SPEC: SIGNIFICANT CHANGE UP
CREAT ?TM UR-MCNC: 78 MG/DL — SIGNIFICANT CHANGE UP
CREAT ?TM UR-MCNC: 79 MG/DL — SIGNIFICANT CHANGE UP
CREAT SERPL-MCNC: 1.72 MG/DL — HIGH (ref 0.5–1.3)
DIFF PNL FLD: NEGATIVE — SIGNIFICANT CHANGE UP
EPI CELLS # UR: 0 /HPF — SIGNIFICANT CHANGE UP
GLUCOSE BLDC GLUCOMTR-MCNC: 145 MG/DL — HIGH (ref 70–99)
GLUCOSE BLDC GLUCOMTR-MCNC: 217 MG/DL — HIGH (ref 70–99)
GLUCOSE BLDC GLUCOMTR-MCNC: 228 MG/DL — HIGH (ref 70–99)
GLUCOSE BLDC GLUCOMTR-MCNC: 284 MG/DL — HIGH (ref 70–99)
GLUCOSE SERPL-MCNC: 104 MG/DL — HIGH (ref 70–99)
GLUCOSE UR QL: NEGATIVE — SIGNIFICANT CHANGE UP
HCT VFR BLD CALC: 46.1 % — SIGNIFICANT CHANGE UP (ref 39–50)
HGB BLD-MCNC: 14.9 G/DL — SIGNIFICANT CHANGE UP (ref 13–17)
HYALINE CASTS # UR AUTO: 0 /LPF — SIGNIFICANT CHANGE UP (ref 0–2)
KETONES UR-MCNC: NEGATIVE — SIGNIFICANT CHANGE UP
LEUKOCYTE ESTERASE UR-ACNC: NEGATIVE — SIGNIFICANT CHANGE UP
MCHC RBC-ENTMCNC: 27.9 PG — SIGNIFICANT CHANGE UP (ref 27–34)
MCHC RBC-ENTMCNC: 32.3 GM/DL — SIGNIFICANT CHANGE UP (ref 32–36)
MCV RBC AUTO: 86.3 FL — SIGNIFICANT CHANGE UP (ref 80–100)
NITRITE UR-MCNC: NEGATIVE — SIGNIFICANT CHANGE UP
NRBC # BLD: 0 /100 WBCS — SIGNIFICANT CHANGE UP (ref 0–0)
OSMOLALITY UR: 525 MOS/KG — SIGNIFICANT CHANGE UP (ref 300–900)
PH UR: 6 — SIGNIFICANT CHANGE UP (ref 5–8)
PLATELET # BLD AUTO: 192 K/UL — SIGNIFICANT CHANGE UP (ref 150–400)
POTASSIUM SERPL-MCNC: 4.6 MMOL/L — SIGNIFICANT CHANGE UP (ref 3.5–5.3)
POTASSIUM SERPL-SCNC: 4.6 MMOL/L — SIGNIFICANT CHANGE UP (ref 3.5–5.3)
POTASSIUM UR-SCNC: 34 MMOL/L — SIGNIFICANT CHANGE UP
PROT ?TM UR-MCNC: 49 MG/DL — HIGH (ref 0–12)
PROT UR-MCNC: ABNORMAL
PROT/CREAT UR-RTO: 0.6 RATIO — HIGH (ref 0–0.2)
RBC # BLD: 5.34 M/UL — SIGNIFICANT CHANGE UP (ref 4.2–5.8)
RBC # FLD: 12.2 % — SIGNIFICANT CHANGE UP (ref 10.3–14.5)
RBC CASTS # UR COMP ASSIST: 0 /HPF — SIGNIFICANT CHANGE UP (ref 0–4)
SODIUM SERPL-SCNC: 133 MMOL/L — LOW (ref 135–145)
SODIUM UR-SCNC: 98 MMOL/L — SIGNIFICANT CHANGE UP
SP GR SPEC: 1.02 — SIGNIFICANT CHANGE UP (ref 1.01–1.02)
TROPONIN T, HIGH SENSITIVITY RESULT: 564 NG/L — HIGH (ref 0–51)
UROBILINOGEN FLD QL: NEGATIVE — SIGNIFICANT CHANGE UP
UUN UR-MCNC: 595 MG/DL — SIGNIFICANT CHANGE UP
WBC # BLD: 6.62 K/UL — SIGNIFICANT CHANGE UP (ref 3.8–10.5)
WBC # FLD AUTO: 6.62 K/UL — SIGNIFICANT CHANGE UP (ref 3.8–10.5)
WBC UR QL: 0 /HPF — SIGNIFICANT CHANGE UP (ref 0–5)

## 2021-12-31 PROCEDURE — 93010 ELECTROCARDIOGRAM REPORT: CPT | Mod: 76,77

## 2021-12-31 PROCEDURE — 93010 ELECTROCARDIOGRAM REPORT: CPT

## 2021-12-31 RX ORDER — ATORVASTATIN CALCIUM 80 MG/1
40 TABLET, FILM COATED ORAL AT BEDTIME
Refills: 0 | Status: DISCONTINUED | OUTPATIENT
Start: 2021-12-31 | End: 2022-01-01

## 2021-12-31 RX ORDER — MORPHINE SULFATE 50 MG/1
0.5 CAPSULE, EXTENDED RELEASE ORAL ONCE
Refills: 0 | Status: DISCONTINUED | OUTPATIENT
Start: 2021-12-31 | End: 2021-12-31

## 2021-12-31 RX ORDER — NITROGLYCERIN 6.5 MG
0.4 CAPSULE, EXTENDED RELEASE ORAL
Refills: 0 | Status: COMPLETED | OUTPATIENT
Start: 2021-12-31 | End: 2021-12-31

## 2021-12-31 RX ADMIN — Medication 2: at 16:44

## 2021-12-31 RX ADMIN — HEPARIN SODIUM 5000 UNIT(S): 5000 INJECTION INTRAVENOUS; SUBCUTANEOUS at 14:30

## 2021-12-31 RX ADMIN — Medication 0.4 MILLIGRAM(S): at 06:11

## 2021-12-31 RX ADMIN — Medication 0.4 MILLIGRAM(S): at 13:10

## 2021-12-31 RX ADMIN — CLOPIDOGREL BISULFATE 75 MILLIGRAM(S): 75 TABLET, FILM COATED ORAL at 11:15

## 2021-12-31 RX ADMIN — MORPHINE SULFATE 0.5 MILLIGRAM(S): 50 CAPSULE, EXTENDED RELEASE ORAL at 14:29

## 2021-12-31 RX ADMIN — Medication 81 MILLIGRAM(S): at 11:14

## 2021-12-31 RX ADMIN — ATORVASTATIN CALCIUM 40 MILLIGRAM(S): 80 TABLET, FILM COATED ORAL at 21:20

## 2021-12-31 RX ADMIN — HEPARIN SODIUM 5000 UNIT(S): 5000 INJECTION INTRAVENOUS; SUBCUTANEOUS at 05:45

## 2021-12-31 RX ADMIN — Medication 0.4 MILLIGRAM(S): at 12:52

## 2021-12-31 RX ADMIN — INSULIN GLARGINE 18 UNIT(S): 100 INJECTION, SOLUTION SUBCUTANEOUS at 21:20

## 2021-12-31 RX ADMIN — Medication 1: at 21:20

## 2021-12-31 RX ADMIN — ATENOLOL 50 MILLIGRAM(S): 25 TABLET ORAL at 05:45

## 2021-12-31 RX ADMIN — MORPHINE SULFATE 0.5 MILLIGRAM(S): 50 CAPSULE, EXTENDED RELEASE ORAL at 03:22

## 2021-12-31 RX ADMIN — MORPHINE SULFATE 0.5 MILLIGRAM(S): 50 CAPSULE, EXTENDED RELEASE ORAL at 04:00

## 2021-12-31 RX ADMIN — Medication 2: at 12:51

## 2021-12-31 RX ADMIN — MORPHINE SULFATE 0.5 MILLIGRAM(S): 50 CAPSULE, EXTENDED RELEASE ORAL at 14:59

## 2021-12-31 NOTE — PROGRESS NOTE ADULT - PROBLEM SELECTOR PLAN 2
on lantus 22 units QHS, metformin  -will dose lantus 18 units QHS and HISS

## 2021-12-31 NOTE — CHART NOTE - NSCHARTNOTEFT_GEN_A_CORE
At 6 a patient with c/o 10/10 chest pain, radiation to his left shoulder, he denies c/o SOB , palpitation, N/V  EKG done - no changes noted,  Call to cardiology for assistance, instructed to give NTG 0.4 SL   Cards will be in to further evaluate patient, Currently patient s/p cath with PCI to LAD in receiving   Atenolol po, possible change to Metoprolol and placing patient on Imdur.    Report given to oncoming team  Nichole Patrick, NP- C  [id number]

## 2021-12-31 NOTE — PROGRESS NOTE ADULT - SUBJECTIVE AND OBJECTIVE BOX
Patient is a 66y old  Male who presents with a chief complaint of chest pain, positive stress test (31 Dec 2021 13:11)      INTERVAL HPI/OVERNIGHT EVENTS: seen and examined, today morning c/o chest pain , resolved now , s/p SL nitro in am   seen by cardio  T(C): 36.8 (21 @ 19:53), Max: 37 (21 @ 07:56)  HR: 69 (21 @ 19:53) (57 - 80)  BP: 136/78 (21 @ 19:53) (136/78 - 170/90)  RR: 18 (21 @ 19:53) (18 - 18)  SpO2: 95% (21 @ 19:53) (95% - 97%)  Wt(kg): --  I&O's Summary    30 Dec 2021 07:  -  31 Dec 2021 07:00  --------------------------------------------------------  IN: 0 mL / OUT: 0 mL / NET: 0 mL    31 Dec 2021 07:  -  31 Dec 2021 20:27  --------------------------------------------------------  IN: 300 mL / OUT: 200 mL / NET: 100 mL        PAST MEDICAL & SURGICAL HISTORY:  HTN (hypertension)    HLD (hyperlipidemia)    T2DM (type 2 diabetes mellitus)    Diabetes mellitus    Dyslipidemia    Hypertension    No significant past surgical history    No significant past surgical history        SOCIAL HISTORY  Alcohol:  Tobacco:  Illicit substance use:    FAMILY HISTORY:    REVIEW OF SYSTEMS:  CONSTITUTIONAL: No fever, weight loss, or fatigue  EYES: No eye pain, visual disturbances, or discharge  ENMT:  No difficulty hearing, tinnitus, vertigo; No sinus or throat pain  NECK: No pain or stiffness  RESPIRATORY: No cough, wheezing, chills or hemoptysis; No shortness of breath  CARDIOVASCULAR: No chest pain, palpitations, dizziness, or leg swelling  GASTROINTESTINAL: No abdominal or epigastric pain. No nausea, vomiting, or hematemesis; No diarrhea or constipation. No melena or hematochezia.  GENITOURINARY: No dysuria, frequency, hematuria, or incontinence  NEUROLOGICAL: No headaches, memory loss, loss of strength, numbness, or tremors  SKIN: No itching, burning, rashes, or lesions   LYMPH NODES: No enlarged glands  ENDOCRINE: No heat or cold intolerance; No hair loss  MUSCULOSKELETAL: No joint pain or swelling; No muscle, back, or extremity pain  PSYCHIATRIC: No depression, anxiety, mood swings, or difficulty sleeping  HEME/LYMPH: No easy bruising, or bleeding gums  ALLERY AND IMMUNOLOGIC: No hives or eczema    RADIOLOGY & ADDITIONAL TESTS:    Imaging Personally Reviewed:  [ ] YES  [ ] NO    Consultant(s) Notes Reviewed:  [ ] YES  [ ] NO    PHYSICAL EXAM:  GENERAL: NAD, well-groomed, well-developed  HEAD:  Atraumatic, Normocephalic  EYES: EOMI, PERRLA, conjunctiva and sclera clear  ENMT: No tonsillar erythema, exudates, or enlargement; Moist mucous membranes, Good dentition, No lesions  NECK: Supple, No JVD, Normal thyroid  NERVOUS SYSTEM:  Alert & Oriented X3, Good concentration; Motor Strength 5/5 B/L upper and lower extremities; DTRs 2+ intact and symmetric  CHEST/LUNG: Clear to percussion bilaterally; No rales, rhonchi, wheezing, or rubs  HEART: Regular rate and rhythm; No murmurs, rubs, or gallops  ABDOMEN: Soft, Nontender, Nondistended; Bowel sounds present  EXTREMITIES:  2+ Peripheral Pulses, No clubbing, cyanosis, or edema  LYMPH: No lymphadenopathy noted  SKIN: No rashes or lesions    LABS:                        14.9   6.62  )-----------( 192      ( 31 Dec 2021 07:29 )             46.1         133<L>  |  99  |  28<H>  ----------------------------<  104<H>  4.6   |  20<L>  |  1.72<H>    Ca    9.5      31 Dec 2021 07:49        Urinalysis Basic - ( 31 Dec 2021 13:10 )    Color: Light Yellow / Appearance: Clear / S.024 / pH: x  Gluc: x / Ketone: Negative  / Bili: Negative / Urobili: Negative   Blood: x / Protein: 30 mg/dL / Nitrite: Negative   Leuk Esterase: Negative / RBC: 0 /hpf / WBC 0 /HPF   Sq Epi: x / Non Sq Epi: 0 /hpf / Bacteria: Negative      CAPILLARY BLOOD GLUCOSE      POCT Blood Glucose.: 217 mg/dL (31 Dec 2021 16:23)  POCT Blood Glucose.: 228 mg/dL (31 Dec 2021 12:03)  POCT Blood Glucose.: 145 mg/dL (31 Dec 2021 07:53)  POCT Blood Glucose.: 331 mg/dL (30 Dec 2021 21:11)        Urinalysis Basic - ( 31 Dec 2021 13:10 )    Color: Light Yellow / Appearance: Clear / S.024 / pH: x  Gluc: x / Ketone: Negative  / Bili: Negative / Urobili: Negative   Blood: x / Protein: 30 mg/dL / Nitrite: Negative   Leuk Esterase: Negative / RBC: 0 /hpf / WBC 0 /HPF   Sq Epi: x / Non Sq Epi: 0 /hpf / Bacteria: Negative        MEDICATIONS  (STANDING):  aspirin  chewable 81 milliGRAM(s) Oral daily  ATENolol  Tablet 50 milliGRAM(s) Oral daily  atorvastatin 40 milliGRAM(s) Oral at bedtime  clopidogrel Tablet 75 milliGRAM(s) Oral daily  dextrose 40% Gel 15 Gram(s) Oral once  dextrose 5%. 1000 milliLiter(s) (50 mL/Hr) IV Continuous <Continuous>  dextrose 5%. 1000 milliLiter(s) (100 mL/Hr) IV Continuous <Continuous>  dextrose 50% Injectable 12.5 Gram(s) IV Push once  dextrose 50% Injectable 25 Gram(s) IV Push once  dextrose 50% Injectable 25 Gram(s) IV Push once  glucagon  Injectable 1 milliGRAM(s) IntraMuscular once  heparin   Injectable 5000 Unit(s) SubCutaneous every 8 hours  insulin glargine Injectable (LANTUS) 18 Unit(s) SubCutaneous at bedtime  insulin lispro (ADMELOG) corrective regimen sliding scale   SubCutaneous at bedtime  insulin lispro (ADMELOG) corrective regimen sliding scale   SubCutaneous three times a day before meals  sodium chloride 0.9% Bolus 400 milliLiter(s) IV Bolus once    MEDICATIONS  (PRN):  acetaminophen     Tablet .. 650 milliGRAM(s) Oral every 6 hours PRN Temp greater or equal to 38C (100.4F), Mild Pain (1 - 3)  melatonin 3 milliGRAM(s) Oral at bedtime PRN Insomnia      Care Discussed with Consultants/Other Providers [ ] YES  [ ] NO

## 2021-12-31 NOTE — PROGRESS NOTE ADULT - PROBLEM SELECTOR PLAN 1
patient with chest pain at rest, has risk factors and positive stress test.   s/p angiogram : shows  obstructive coronaries : s/p stents placed   c/w asa/plavix   cardio following
patient with chest pain at rest, has risk factors and positive stress test.   s/p angiogram : shows  obstructive coronaries : s/p stents placed   c/w asa/plavix   cardio following
patient with chest pain at rest, has risk factors and positive stress test.   s/p angiogram : shows  obstructive coronaries : unable to find he had stent or not

## 2021-12-31 NOTE — PROGRESS NOTE ADULT - PROBLEM SELECTOR PLAN 4
CKD stage 3   will consult nephro   he did rece'd contrast during angiogram   monitor / trend renal fx

## 2021-12-31 NOTE — PROGRESS NOTE ADULT - NSPROGADDITIONALINFOA_GEN_ALL_CORE
dispo: if cleared by cardio , d/c planning
dispo: ok to be d/c home in am , explained patient that he need to take asa/plavix daily , f/u with cardio and pcp

## 2021-12-31 NOTE — PROGRESS NOTE ADULT - SUBJECTIVE AND OBJECTIVE BOX
Patient seen and examined  no complaints    No Known Allergies    Hospital Medications:   MEDICATIONS  (STANDING):  aspirin  chewable 81 milliGRAM(s) Oral daily  ATENolol  Tablet 50 milliGRAM(s) Oral daily  atorvastatin 40 milliGRAM(s) Oral at bedtime  clopidogrel Tablet 75 milliGRAM(s) Oral daily  dextrose 40% Gel 15 Gram(s) Oral once  dextrose 5%. 1000 milliLiter(s) (50 mL/Hr) IV Continuous <Continuous>  dextrose 5%. 1000 milliLiter(s) (100 mL/Hr) IV Continuous <Continuous>  dextrose 50% Injectable 12.5 Gram(s) IV Push once  dextrose 50% Injectable 25 Gram(s) IV Push once  dextrose 50% Injectable 25 Gram(s) IV Push once  glucagon  Injectable 1 milliGRAM(s) IntraMuscular once  heparin   Injectable 5000 Unit(s) SubCutaneous every 8 hours  insulin glargine Injectable (LANTUS) 18 Unit(s) SubCutaneous at bedtime  insulin lispro (ADMELOG) corrective regimen sliding scale   SubCutaneous at bedtime  insulin lispro (ADMELOG) corrective regimen sliding scale   SubCutaneous three times a day before meals  sodium chloride 0.9% Bolus 400 milliLiter(s) IV Bolus once        VITALS:  T(F): 98.1 (12-31-21 @ 12:26), Max: 98.6 (12-31-21 @ 07:56)  HR: 63 (12-31-21 @ 12:26)  BP: 167/82 (12-31-21 @ 12:26)  RR: 18 (12-31-21 @ 12:26)  SpO2: 95% (12-31-21 @ 12:26)  Wt(kg): --    12-30 @ 07:01  -  12-31 @ 07:00  --------------------------------------------------------  IN: 0 mL / OUT: 0 mL / NET: 0 mL        PHYSICAL EXAM:  Constitutional: NAD  HEENT: anicteric sclera, oropharynx clear, MMM  Neck: No JVD  Respiratory: CTAB, no wheezes, rales or rhonchi  Cardiovascular: S1, S2, RRR  Gastrointestinal: BS+, soft, NT/ND  Extremities: No cyanosis or clubbing. No peripheral edema  Neurological: A/O x 3, no focal deficits  Psychiatric: Normal mood, normal affect  : No CVA tenderness. No sahni.   Skin: No rashes    LABS:  12-31    133<L>  |  99  |  28<H>  ----------------------------<  104<H>  4.6   |  20<L>  |  1.72<H>    Ca    9.5      31 Dec 2021 07:49      Creatinine Trend: 1.72 <--, 1.82 <--, 2.06 <--, 1.94 <--, 1.99 <--                        14.9   6.62  )-----------( 192      ( 31 Dec 2021 07:29 )             46.1     Urine Studies:      RADIOLOGY & ADDITIONAL STUDIES:

## 2021-12-31 NOTE — CHART NOTE - NSCHARTNOTEFT_GEN_A_CORE
At 3 am patient with c/o 8/10 chest pain, mid-sternal non radiating, /92 EKG was done  no changes noted.  Morphine 0.5 mg IVP given x 1 with relief of pain   Ongoing monitoring  Nichole Patrick NP-c  [id number]

## 2021-12-31 NOTE — PROGRESS NOTE ADULT - SUBJECTIVE AND OBJECTIVE BOX
Patient is a 66y old  Male who presents with a chief complaint of chest pain, positive stress test (30 Dec 2021 20:07)  s/p stent in mLAD (90%), pDiag POBA   c/o epigastric pain on & off post stent, no EKG changes    Allergies    No Known Allergies    Intolerances        Medications:  acetaminophen     Tablet .. 650 milliGRAM(s) Oral every 6 hours PRN  aspirin  chewable 81 milliGRAM(s) Oral daily  ATENolol  Tablet 50 milliGRAM(s) Oral daily  clopidogrel Tablet 75 milliGRAM(s) Oral daily  dextrose 40% Gel 15 Gram(s) Oral once  dextrose 5%. 1000 milliLiter(s) IV Continuous <Continuous>  dextrose 5%. 1000 milliLiter(s) IV Continuous <Continuous>  dextrose 50% Injectable 12.5 Gram(s) IV Push once  dextrose 50% Injectable 25 Gram(s) IV Push once  dextrose 50% Injectable 25 Gram(s) IV Push once  glucagon  Injectable 1 milliGRAM(s) IntraMuscular once  heparin   Injectable 5000 Unit(s) SubCutaneous every 8 hours  insulin glargine Injectable (LANTUS) 18 Unit(s) SubCutaneous at bedtime  insulin lispro (ADMELOG) corrective regimen sliding scale   SubCutaneous at bedtime  insulin lispro (ADMELOG) corrective regimen sliding scale   SubCutaneous three times a day before meals  melatonin 3 milliGRAM(s) Oral at bedtime PRN  nitroglycerin     SubLingual 0.4 milliGRAM(s) SubLingual every 5 minutes PRN  sodium chloride 0.9% Bolus 400 milliLiter(s) IV Bolus once      Vitals:  T(C): 37 (12-31-21 @ 07:56), Max: 37 (12-31-21 @ 07:56)  HR: 57 (12-31-21 @ 07:56) (53 - 80)  BP: 168/86 (12-31-21 @ 07:56) (135/77 - 170/90)  BP(mean): --  RR: 18 (12-31-21 @ 07:56) (17 - 18)  SpO2: 97% (12-31-21 @ 07:56) (94% - 98%)  Wt(kg): --  Daily Height in cm: 165.1 (30 Dec 2021 12:11)    Daily   I&O's Summary    30 Dec 2021 07:01  -  31 Dec 2021 07:00  --------------------------------------------------------  IN: 0 mL / OUT: 0 mL / NET: 0 mL      Physical Exam:  Appearance: Normal  Eyes: PERRL, EOMI  HENT: Normal oral muscosa, NC/AT  Cardiovascular: S1S2, RRR, No M/R/G, no JVD, No Lower extremity edema  Procedural Access RRA Site: No hematoma, Non-tender to palpation, 2+ pulse, No bruit, No Ecchymosis  Respiratory: Clear to auscultation bilaterally  Gastrointestinal: Soft, Non tender, Normal Bowel Sounds  Musculoskeletal: No clubbing, No joint deformity   Neurologic: Non-focal  Lymphatic: No lymphadenopathy  Psychiatry: AAOx3, Mood & affect appropriate  Skin: No rashes, No ecchymoses, No cyanosis    12-30    137  |  102  |  33<H>  ----------------------------<  95  4.6   |  21<L>  |  1.82<H>    Ca    9.8      30 Dec 2021 07:31        Serum Pro-Brain Natriuretic Peptide: 106 pg/mL (12-27 @ 17:01)    Lipid panel  Cholesterol, Serum: 135 mg/dL Triglycerides, Serum: 272 mg/dL   HDL Cholesterol, Serum: 28 mg/dL   LDL Cholesterol Calculated: 53 mg/dL   Hgb A1c A1C with Estimated Average Glucose Result: 7.3: Method: Immunoassay     ECG:    Echo:  < from: Transthoracic Echocardiogram (12.28.21 @ 08:38) >  1. Calcified trileaflet aortic valve with normal opening.  2. Increased relative wall thickness with normal left ventricular mass index, consistent with concentric left ventricular remodeling.  3. Normal left ventricular systolic function. No segmental wall motion abnormalities.  4. Increased E/e'  is consistent with elevated left ventricular filling pressure.  5. Normal right ventricular size and function.  6. Normal pericardium with no pericardial effusion. EF 70% *** No previous Echo exam. < end of copied text >      Stress Testing:   < from: Nuclear Stress Test-Pharmacologic (Nuclear Stress Test-Pharmacologic .) (12.28.21 @ 12:09) >  * There are medium-sized, mild defects in the mid to distal anterior, distal anterolateral, and apical lateral walls that are reversible suggestive of ischemia.  * There are medium-sized, mild to moderate defects in the inferolateral, basal inferior, and basal septal walls that are mostly fixed suggestive of infarct with mild jeison-infarct ischemia.  * Post-stress gated wall motion analysis was performed (LVEF = 68 %;LVEDV = 59 ml.) revealing hypokinesis of the inferolateral, basal inferior, and basal septal walls.  Overall left ventricular ejection fraction is normal. *** No previous Nuclear/Stress exam. < end of copied text >    Cath:  < from: Cardiac Catheterization (12.30.21 @ 14:16) >  Procedures:                1.    Art Access - R radial artery     2.    Drug Eluting Stent Initial Vessel   3.    PTCA Additional Vessel     Indications: Unstable angina     Conclusions:   Successful PCI with stent of mid LAD and balloon of D1.  DAPT for 6 mths < end of copied text >    Imaging:    Interpretation of Telemetry: SB, SR 55-70's

## 2022-01-01 ENCOUNTER — TRANSCRIPTION ENCOUNTER (OUTPATIENT)
Age: 67
End: 2022-01-01

## 2022-01-01 VITALS
DIASTOLIC BLOOD PRESSURE: 69 MMHG | HEART RATE: 63 BPM | TEMPERATURE: 99 F | RESPIRATION RATE: 18 BRPM | OXYGEN SATURATION: 96 % | SYSTOLIC BLOOD PRESSURE: 126 MMHG

## 2022-01-01 LAB
ANION GAP SERPL CALC-SCNC: 16 MMOL/L — SIGNIFICANT CHANGE UP (ref 5–17)
BUN SERPL-MCNC: 31 MG/DL — HIGH (ref 7–23)
CALCIUM SERPL-MCNC: 9.7 MG/DL — SIGNIFICANT CHANGE UP (ref 8.4–10.5)
CHLORIDE SERPL-SCNC: 98 MMOL/L — SIGNIFICANT CHANGE UP (ref 96–108)
CO2 SERPL-SCNC: 22 MMOL/L — SIGNIFICANT CHANGE UP (ref 22–31)
CREAT SERPL-MCNC: 2.07 MG/DL — HIGH (ref 0.5–1.3)
GLUCOSE BLDC GLUCOMTR-MCNC: 184 MG/DL — HIGH (ref 70–99)
GLUCOSE BLDC GLUCOMTR-MCNC: 207 MG/DL — HIGH (ref 70–99)
GLUCOSE BLDC GLUCOMTR-MCNC: 210 MG/DL — HIGH (ref 70–99)
GLUCOSE SERPL-MCNC: 208 MG/DL — HIGH (ref 70–99)
POTASSIUM SERPL-MCNC: 4.9 MMOL/L — SIGNIFICANT CHANGE UP (ref 3.5–5.3)
POTASSIUM SERPL-SCNC: 4.9 MMOL/L — SIGNIFICANT CHANGE UP (ref 3.5–5.3)
SODIUM SERPL-SCNC: 136 MMOL/L — SIGNIFICANT CHANGE UP (ref 135–145)

## 2022-01-01 PROCEDURE — 93454 CORONARY ARTERY ANGIO S&I: CPT

## 2022-01-01 PROCEDURE — 93017 CV STRESS TEST TRACING ONLY: CPT

## 2022-01-01 PROCEDURE — C1725: CPT

## 2022-01-01 PROCEDURE — 80061 LIPID PANEL: CPT

## 2022-01-01 PROCEDURE — C1769: CPT

## 2022-01-01 PROCEDURE — 99153 MOD SED SAME PHYS/QHP EA: CPT

## 2022-01-01 PROCEDURE — 71046 X-RAY EXAM CHEST 2 VIEWS: CPT

## 2022-01-01 PROCEDURE — 84156 ASSAY OF PROTEIN URINE: CPT

## 2022-01-01 PROCEDURE — A9500: CPT

## 2022-01-01 PROCEDURE — 82550 ASSAY OF CK (CPK): CPT

## 2022-01-01 PROCEDURE — 92921: CPT | Mod: LD

## 2022-01-01 PROCEDURE — 80053 COMPREHEN METABOLIC PANEL: CPT

## 2022-01-01 PROCEDURE — U0005: CPT

## 2022-01-01 PROCEDURE — 83036 HEMOGLOBIN GLYCOSYLATED A1C: CPT

## 2022-01-01 PROCEDURE — U0003: CPT

## 2022-01-01 PROCEDURE — C9600: CPT | Mod: LD

## 2022-01-01 PROCEDURE — C1887: CPT

## 2022-01-01 PROCEDURE — 86803 HEPATITIS C AB TEST: CPT

## 2022-01-01 PROCEDURE — 83690 ASSAY OF LIPASE: CPT

## 2022-01-01 PROCEDURE — 84300 ASSAY OF URINE SODIUM: CPT

## 2022-01-01 PROCEDURE — 82570 ASSAY OF URINE CREATININE: CPT

## 2022-01-01 PROCEDURE — 82962 GLUCOSE BLOOD TEST: CPT

## 2022-01-01 PROCEDURE — 84133 ASSAY OF URINE POTASSIUM: CPT

## 2022-01-01 PROCEDURE — 85027 COMPLETE CBC AUTOMATED: CPT

## 2022-01-01 PROCEDURE — 83880 ASSAY OF NATRIURETIC PEPTIDE: CPT

## 2022-01-01 PROCEDURE — 93005 ELECTROCARDIOGRAM TRACING: CPT

## 2022-01-01 PROCEDURE — 36415 COLL VENOUS BLD VENIPUNCTURE: CPT

## 2022-01-01 PROCEDURE — 76705 ECHO EXAM OF ABDOMEN: CPT

## 2022-01-01 PROCEDURE — 96375 TX/PRO/DX INJ NEW DRUG ADDON: CPT

## 2022-01-01 PROCEDURE — 85025 COMPLETE CBC W/AUTO DIFF WBC: CPT

## 2022-01-01 PROCEDURE — 99152 MOD SED SAME PHYS/QHP 5/>YRS: CPT

## 2022-01-01 PROCEDURE — 84540 ASSAY OF URINE/UREA-N: CPT

## 2022-01-01 PROCEDURE — C1874: CPT

## 2022-01-01 PROCEDURE — C1894: CPT

## 2022-01-01 PROCEDURE — 93306 TTE W/DOPPLER COMPLETE: CPT

## 2022-01-01 PROCEDURE — 83935 ASSAY OF URINE OSMOLALITY: CPT

## 2022-01-01 PROCEDURE — 80048 BASIC METABOLIC PNL TOTAL CA: CPT

## 2022-01-01 PROCEDURE — 76770 US EXAM ABDO BACK WALL COMP: CPT | Mod: 26

## 2022-01-01 PROCEDURE — 82436 ASSAY OF URINE CHLORIDE: CPT

## 2022-01-01 PROCEDURE — 99285 EMERGENCY DEPT VISIT HI MDM: CPT | Mod: 25

## 2022-01-01 PROCEDURE — 76770 US EXAM ABDO BACK WALL COMP: CPT

## 2022-01-01 PROCEDURE — 81001 URINALYSIS AUTO W/SCOPE: CPT

## 2022-01-01 PROCEDURE — 82553 CREATINE MB FRACTION: CPT

## 2022-01-01 PROCEDURE — G0378: CPT

## 2022-01-01 PROCEDURE — 96374 THER/PROPH/DIAG INJ IV PUSH: CPT

## 2022-01-01 PROCEDURE — 84484 ASSAY OF TROPONIN QUANT: CPT

## 2022-01-01 PROCEDURE — 78452 HT MUSCLE IMAGE SPECT MULT: CPT | Mod: MA

## 2022-01-01 RX ORDER — LISINOPRIL 2.5 MG/1
1 TABLET ORAL
Qty: 0 | Refills: 0 | DISCHARGE

## 2022-01-01 RX ORDER — ASPIRIN/CALCIUM CARB/MAGNESIUM 324 MG
1 TABLET ORAL
Qty: 30 | Refills: 0
Start: 2022-01-01 | End: 2022-01-30

## 2022-01-01 RX ORDER — CLOPIDOGREL BISULFATE 75 MG/1
1 TABLET, FILM COATED ORAL
Qty: 30 | Refills: 0
Start: 2022-01-01 | End: 2022-01-30

## 2022-01-01 RX ORDER — ASPIRIN/CALCIUM CARB/MAGNESIUM 324 MG
1 TABLET ORAL
Qty: 0 | Refills: 0 | DISCHARGE

## 2022-01-01 RX ORDER — ATORVASTATIN CALCIUM 80 MG/1
1 TABLET, FILM COATED ORAL
Qty: 30 | Refills: 0
Start: 2022-01-01 | End: 2022-01-30

## 2022-01-01 RX ADMIN — Medication 81 MILLIGRAM(S): at 16:09

## 2022-01-01 RX ADMIN — Medication 2: at 16:40

## 2022-01-01 RX ADMIN — CLOPIDOGREL BISULFATE 75 MILLIGRAM(S): 75 TABLET, FILM COATED ORAL at 16:09

## 2022-01-01 RX ADMIN — Medication 2: at 07:39

## 2022-01-01 RX ADMIN — HEPARIN SODIUM 5000 UNIT(S): 5000 INJECTION INTRAVENOUS; SUBCUTANEOUS at 05:11

## 2022-01-01 RX ADMIN — Medication 1: at 11:40

## 2022-01-01 RX ADMIN — ATENOLOL 50 MILLIGRAM(S): 25 TABLET ORAL at 05:11

## 2022-01-01 RX ADMIN — HEPARIN SODIUM 5000 UNIT(S): 5000 INJECTION INTRAVENOUS; SUBCUTANEOUS at 16:09

## 2022-01-01 NOTE — DISCHARGE NOTE PROVIDER - CARE PROVIDER_API CALL
Song Delgado)  Cardiovascular Disease; Interventional Cardiology  [address] [place] [address]  Phone: [phone]  Fax: [phone]  Follow Up Time:    Song Delgado)  Cardiovascular Disease; Interventional Cardiology  [address] [place] [address]  Phone: [phone]  Fax: [phone]  Follow Up Time:     [facility]  [address] [place] [address]  Phone: [phone]  Fax: [phone]  Follow Up Time: 1 week

## 2022-01-01 NOTE — DISCHARGE NOTE PROVIDER - HOSPITAL COURSE
65 yo male PMHX Burmese male pmhx HTN, insulin dependent diabetes type 2 admitted with progressive chest pain. Patient states approximately 2 weeks ago as was lying down for bed experienced midsternal chest pain x 30 minutes, pressure. This has intermittently been going on and now has occurred at rest currently.  Patient presented to ED as pain did not go away.     Pt was initially observed in CDU had positive stress test and admitted for cath. Patient currently with 1/10 chest pain, midsternal, non-radiating, pressure like pain, much improved from presentation No HARMON, SOB, abdominal pain, n/v/d/c.    On 12/30 pt underwent successful PCI with stent of mid LAD and balloon of D1.  DAPT for 6mths. Coronary Angiography demonstrated LAD, Mid left anterior descending,90 % stenosis.    Last night patient with c/o 10/10 chest pain, radiation to his left shoulder, he denies c/o SOB , palpitation, N/V. EKG done - no changes noted,  Cardiology called for assistance, instructed to give NTG 0.4 SL   Pt on Atenolol po, possible change to Metoprolol and placing patient on Imdur.     Cards recommended to continue ASA/ Plavix and statin. Glycemic control, f/u with cardiologist in 2 weeks. Pt cleared by Cardiology for discharge however, pt noted to have BESSY on CKD, ACE inhibitor was held. Nephrology consulted who noted possible rise from contrast, and ordered renal u/s.    ***Pt waiting for renal sono before discharge.****    Pt is ready for discharge in stable condition.  Pt will follow up with Interventional Cardiology and Primary Care Physician as an outpatient in one to two weeks.   65 yo male PMHX Micronesian male pmhx HTN, insulin dependent diabetes type 2 admitted with progressive chest pain. Patient states approximately 2 weeks ago as was lying down for bed experienced midsternal chest pain x 30 minutes, pressure. This has intermittently been going on and now has occurred at rest currently.  Patient presented to ED as pain did not go away.     Pt was initially observed in CDU had positive stress test and admitted for cath. Patient currently with 1/10 chest pain, midsternal, non-radiating, pressure like pain, much improved from presentation No HARMON, SOB, abdominal pain, n/v/d/c.    On 12/30 pt underwent successful PCI with stent of mid LAD and balloon of D1.  DAPT for 6mths. Coronary Angiography demonstrated LAD, Mid left anterior descending,90 % stenosis.    Last night patient with c/o 10/10 chest pain, radiation to his left shoulder, he denies c/o SOB , palpitation, N/V. EKG done - no changes noted,  Cardiology called for assistance, instructed to give NTG 0.4 SL   Pt on Atenolol po, possible change to Metoprolol and placing patient on Imdur.     Cards recommended to continue ASA/ Plavix and statin. Glycemic control, f/u with cardiologist in 2 weeks. Pt cleared by Cardiology for discharge however, pt noted to have BESSY on CKD, ACE inhibitor was held. Nephrology consulted who noted possible rise from contrast, and ordered renal u/s.    Renal u/s showed findings consistent with medical renal disease, no hydronephrosis.    Pt is ready for discharge in stable condition.  Pt will follow up with Interventional Cardiology and Primary Care Physician as an outpatient in one to two weeks.   65 yo M PMHX HTN, insulin dependent diabetes type 2 admitted with progressive chest pain. Pt with positive stress test and admitted for cath. On 12/30 pt underwent successful PCI with stent to mid LAD and balloon of D1.  DAPT for 6 months. Coronary Angiography demonstrated LAD, Mid left anterior descending, 90 % stenosis. Pt c/o CP post cath and EKG was done with no changes noted, no ST/T wave changes. Cardiology called with recs to give NTG 0.4 SL and to monitor for additional episodes of CP. Cards recommended to continue ASA/ Plavix and statin. Glycemic control, f/u with cardiologist in 2 weeks. Pt cleared by Cardiology for discharge however, pt noted to have BESSY on CKD, ACE inhibitor was held. Nephrology consulted who noted possible rise from contrast, and ordered renal u/s. Renal u/s showed findings consistent with medical renal disease, no hydronephrosis.    Pt is ready for discharge in stable condition.  Pt will follow up with Cardiology and Primary Care Physician as an outpatient in one to two weeks. Discussed with medical attending.

## 2022-01-01 NOTE — PROGRESS NOTE ADULT - ASSESSMENT
65 yo male PMHx Bruneian male pmhx HTN, DMT2, admitted for mid sternal chest pain and had positive NST.  S/p Stent in mLAD and POBA in pDiag via RRA access site. Site benign, no hematoma or ecchymosis     -CAD  Continue ASA/ Plavix and statin  Glycemic control  F/u with cardiologist in 2 weeks    D/C instruction discussed with pt (understood and verbalized)                
65 yo male insulin dependent DM type 2, HTN presents with unstable angina admitted with positive stress test s/p cath    1) BESSY on CKD stage 3  Cr stable around 1.7 to 1.8mg/dl --> likely baseline  Pt cannot recall his cr but says his egfr runs around 40 to 50%  check renal US  s/p Contrast-> may see a rise in cr  will monitor off ivf for now  encourage po intake  avoid nephrotoxins  trend bmp    Dr Pradhan  [phone]
67 yo male insulin dependent DM type 2, HTN presents with unstable angina admitted with positive stress test for cath .
67 yo male insulin dependent DM type 2, HTN presents with unstable angina admitted with positive stress test for cath .
67 yo male insulin dependent DM type 2, HTN presents with unstable angina admitted with positive stress test s/p cath    1) BESSY on CKD stage 3  Cr stable around 1.7 to 1.8mg/dl --> likely baseline  US pending   Cr slightly higher==>s/p Cardiac cath 12/30/21-> Slightly rise in cr could be 2/2 contrast  will monitor off ivf for now  encourage po intake  avoid nephrotoxins  trend bmp    Dr Pradhan  [phone]
65 yo male insulin dependent DM type 2, HTN presents with unstable angina admitted with positive stress test for cath .

## 2022-01-01 NOTE — PROGRESS NOTE ADULT - REASON FOR ADMISSION
chest pain, positive stress test

## 2022-01-01 NOTE — DISCHARGE NOTE PROVIDER - CARE PROVIDERS DIRECT ADDRESSES
[email or web address]

## 2022-01-01 NOTE — DISCHARGE NOTE PROVIDER - NSDCCPCAREPLAN_GEN_ALL_CORE_FT
PRINCIPAL DISCHARGE DIAGNOSIS  Diagnosis: Unstable angina  Assessment and Plan of Treatment: You underwent Cardiac Catheterization. Please follow up with your Interventional Cardiologist Dr. Song Delgado regarding your hospitalization, and schedule an appointment within 7-10 days after your discharge to review your hospital course.  Please  review all your current medications, and dose adjust as prescribed.  Call their office [phone] for appointment.        SECONDARY DISCHARGE DIAGNOSES  Diagnosis: Elevated serum creatinine  Assessment and Plan of Treatment: Please follow up with your Primary Care Physician regarding your hospitalization, and schedule an appointment within two weeks after your discharge to review your hospital course.  Please  review all your current medications, and dose adjust as prescribed by your Primary Care Physician.  Call their office for appointment.      Diagnosis: HTN (hypertension)  Assessment and Plan of Treatment: Please follow up with your Primary Care Physician regarding your hospitalization, and schedule an appointment within two weeks after your discharge to review your hospital course.  Please  review all your current medications, and dose adjust as prescribed by your Primary Care Physician.  Call their office for appointment.      Diagnosis: Diabetes mellitus  Assessment and Plan of Treatment: Please follow up with your Primary Care Physician. Review all medication dosages s as prescribed.       PRINCIPAL DISCHARGE DIAGNOSIS  Diagnosis: Unstable angina  Assessment and Plan of Treatment: You underwent Cardiac Catheterization. Please follow up with your Interventional Cardiologist Dr. Song Delgado regarding your hospitalization, and schedule an appointment within 7-10 days after your discharge to review your hospital course.  Please  review all your current medications, and dose adjust as prescribed. Please take Plavix and Aspirin daily. Call the office [phone] for appointment.        SECONDARY DISCHARGE DIAGNOSES  Diagnosis: Elevated serum creatinine  Assessment and Plan of Treatment: Please follow up with your Primary Care Physician for follow up blood work.    Diagnosis: HTN (hypertension)  Assessment and Plan of Treatment: Please follow up with your Primary Care Physician regarding your hospitalization, and schedule an appointment within two weeks after your discharge to review your hospital course.  Please  review all your current medications, and dose adjust as prescribed by your Primary Care Physician.  Call their office for appointment.      Diagnosis: Diabetes mellitus  Assessment and Plan of Treatment: Please follow up with your Primary Care Physician. Review all medication dosages s as prescribed.       PRINCIPAL DISCHARGE DIAGNOSIS  Diagnosis: Unstable angina  Assessment and Plan of Treatment: You underwent Cardiac Catheterization. Please follow up with your Dr. Song Delgado regarding your hospitalization, and schedule an appointment within 7-10 days after your discharge to review your hospital course.  Please review all your current medications. Please take Plavix and Aspirin daily. Call the office [phone] for appointment.      SECONDARY DISCHARGE DIAGNOSES  Diagnosis: Elevated serum creatinine  Assessment and Plan of Treatment: Please follow up with your Primary Care Physician for follow up blood work.  A renal ultrasound was performed while hospitalized.    Diagnosis: HTN (hypertension)  Assessment and Plan of Treatment: Take medications for your blood pressure as recommended.  Eat a heart healthy diet that is low in saturated fats and salt, and includes whole grains, fruits, vegetables and lean protein   Exercise regularly (consult with your physician or cardiologist first); maintain a heart healthy weight.   Seek medical help for dizziness, Lightheadedness, Blurry vision, Headache, Chest pain, Shortness of breath  Follow up with your medical doctor to establish long term blood pressure treatment goals.    Diagnosis: Diabetes mellitus  Assessment and Plan of Treatment: Please follow up with your Primary Care Physician. Continue current diabetes home medications.

## 2022-01-01 NOTE — DISCHARGE NOTE NURSING/CASE MANAGEMENT/SOCIAL WORK - NSDCPEFALRISK_GEN_ALL_CORE
For information on Fall & Injury Prevention, visit: [email or web address] OR  [email or web address] OR  https://www.cdc.gov/steadi/patient.html

## 2022-01-01 NOTE — PROGRESS NOTE ADULT - SUBJECTIVE AND OBJECTIVE BOX
Patient seen and examined  no complaints    No Known Allergies    Hospital Medications:   MEDICATIONS  (STANDING):  aspirin  chewable 81 milliGRAM(s) Oral daily  ATENolol  Tablet 50 milliGRAM(s) Oral daily  atorvastatin 40 milliGRAM(s) Oral at bedtime  clopidogrel Tablet 75 milliGRAM(s) Oral daily  dextrose 40% Gel 15 Gram(s) Oral once  dextrose 5%. 1000 milliLiter(s) (50 mL/Hr) IV Continuous <Continuous>  dextrose 5%. 1000 milliLiter(s) (100 mL/Hr) IV Continuous <Continuous>  dextrose 50% Injectable 12.5 Gram(s) IV Push once  dextrose 50% Injectable 25 Gram(s) IV Push once  dextrose 50% Injectable 25 Gram(s) IV Push once  glucagon  Injectable 1 milliGRAM(s) IntraMuscular once  heparin   Injectable 5000 Unit(s) SubCutaneous every 8 hours  insulin glargine Injectable (LANTUS) 18 Unit(s) SubCutaneous at bedtime  insulin lispro (ADMELOG) corrective regimen sliding scale   SubCutaneous at bedtime  insulin lispro (ADMELOG) corrective regimen sliding scale   SubCutaneous three times a day before meals  sodium chloride 0.9% Bolus 400 milliLiter(s) IV Bolus once        VITALS:  T(F): 98.8 (22 @ 11:01), Max: 98.8 (22 @ 11:01)  HR: 63 (22 @ 11:01)  BP: 126/69 (22 @ 11:01)  RR: 18 (22 @ 11:01)  SpO2: 96% (22 @ 11:01)  Wt(kg): --     @  @ 07:00  --------------------------------------------------------  IN: 300 mL / OUT: 500 mL / NET: -200 mL     @ 07:  -   @ 14:02  --------------------------------------------------------  IN: 480 mL / OUT: 0 mL / NET: 480 mL        PHYSICAL EXAM:  Constitutional: NAD  HEENT: anicteric sclera, oropharynx clear, MMM  Neck: No JVD  Respiratory: CTAB, no wheezes, rales or rhonchi  Cardiovascular: S1, S2, RRR  Gastrointestinal: BS+, soft, NT/ND  Extremities: No cyanosis or clubbing. No peripheral edema  Neurological: A/O x 3, no focal deficits  Psychiatric: Normal mood, normal affect  : No CVA tenderness. No sahni.   Skin: No rashes      LABS:      136  |  98  |  31<H>  ----------------------------<  208<H>  4.9   |  22  |  2.07<H>    Ca    9.7      2022 07:40      Creatinine Trend: 2.07 <--, 1.72 <--, 1.82 <--, 2.06 <--, 1.94 <--, 1.99 <--                        14.9   6.62  )-----------( 192      ( 31 Dec 2021 07:29 )             46.1     Urine Studies:  Urinalysis Basic - ( 31 Dec 2021 13:10 )    Color: Light Yellow / Appearance: Clear / S.024 / pH:   Gluc:  / Ketone: Negative  / Bili: Negative / Urobili: Negative   Blood:  / Protein: 30 mg/dL / Nitrite: Negative   Leuk Esterase: Negative / RBC: 0 /hpf / WBC 0 /HPF   Sq Epi:  / Non Sq Epi: 0 /hpf / Bacteria: Negative      Creatinine, Random Urine: 79 mg/dL ( @ 15:09)  Protein/Creatinine Ratio Calculation: 0.6 Ratio ( @ 15:09)  Potassium, Random Urine: 34 mmol/L ( @ 13:10)  Osmolality, Random Urine: 525 mos/kg ( @ 13:10)  Sodium, Random Urine: 98 mmol/L ( @ 13:10)  Chloride, Random Urine: 91 mmol/L ( @ 13:10)  Creatinine, Random Urine: 78 mg/dL ( @ 13:10)    RADIOLOGY & ADDITIONAL STUDIES:

## 2022-01-01 NOTE — DISCHARGE NOTE PROVIDER - NSDCMRMEDTOKEN_GEN_ALL_CORE_FT
aspirin 81 mg oral tablet: 1 tab(s) orally once a day  atenolol 50 mg oral tablet: 1 tab(s) orally once a day  Lantus 100 units/mL subcutaneous solution: 22 unit(s) subcutaneous once a day (at bedtime)  lisinopril 20 mg oral tablet: 1 tab(s) orally once a day  metFORMIN 750 mg oral tablet, extended release: 1 tab(s) orally once a day   atenolol 50 mg oral tablet: 1 tab(s) orally once a day  Lantus 100 units/mL subcutaneous solution: 22 unit(s) subcutaneous once a day (at bedtime)  lisinopril 20 mg oral tablet: 1 tab(s) orally once a day  metFORMIN 750 mg oral tablet, extended release: 1 tab(s) orally once a day   aspirin 81 mg oral tablet, chewable: 1 tab(s) orally once a day  atenolol 50 mg oral tablet: 1 tab(s) orally once a day  atorvastatin 40 mg oral tablet: 1 tab(s) orally once a day (at bedtime)  clopidogrel 75 mg oral tablet: 1 tab(s) orally once a day  Lantus 100 units/mL subcutaneous solution: 22 unit(s) subcutaneous once a day (at bedtime)  metFORMIN 750 mg oral tablet, extended release: 1 tab(s) orally once a day

## 2022-01-01 NOTE — DISCHARGE NOTE PROVIDER - NSDCFUADDAPPT_GEN_ALL_CORE_FT
APPTS ARE READY TO BE MADE: [X] YES    Best Family or Patient Contact (if needed):    Additional Information about above appointments (if needed):    1: Cardiology  2: Primary care provider     Other comments or requests:

## 2022-01-01 NOTE — DISCHARGE NOTE NURSING/CASE MANAGEMENT/SOCIAL WORK - PATIENT PORTAL LINK FT
You can access the FollowMyHealth Patient Portal offered by [facility] by registering at the following website: [email or web address]. By joining "[employer]’s FollowMyHealth portal, you will also be able to view your health information using other applications (apps) compatible with our system.

## 2022-01-01 NOTE — DISCHARGE NOTE PROVIDER - PROVIDER TOKENS
PROVIDER:[TOKEN:[[id number]]] PROVIDER:[TOKEN:[[id number]]],PROVIDER:[TOKEN:[[id number]],FOLLOWUP:[1 week]]

## 2022-01-18 ENCOUNTER — APPOINTMENT (OUTPATIENT)
Dept: OTOLARYNGOLOGY | Facility: CLINIC | Age: 67
End: 2022-01-18

## 2022-08-29 NOTE — ED CLERICAL - DIVISION
What Is The Reason For Today's Visit?: History of Melanoma [facility]... Year Excised?: 4/1/2022

## 2023-04-26 NOTE — ED CDU PROVIDER SUBSEQUENT DAY NOTE - CARDIOVASCULAR [-], MLM
PHYSICAL / OCCUPATIONAL THERAPY - DAILY TREATMENT NOTE (updated )    Patient Name: Tim Stone    Date: 2023    : 1942  Insurance: Payor: MEDICARE / Plan: MEDICARE PART A AND B / Product Type: *No Product type* /      Patient  verified Yes     Visit #   Current / Total 7 12   Time   In / Out   10:11A  10:54A     Pain   In / Out 2 0   Subjective Functional Status/Changes: Pt reports continuing to experience low level constant pain/discomfort at right knee, however overall fells that he is making steady improvements.    Changes to:  Meds, Allergies, Med Hx, Sx Hx?  If yes, update Summary List no       TREATMENT AREA =  Pain in right knee [M25.561]    OBJECTIVE    Therapeutic Procedures:    Tx Min Billable or 1:1 Min (if diff from Tx Min) Procedure, Rationale, Specifics    65869 Therapeutic Exercise (timed):  increase ROM, strength, coordination, balance, and proprioception to improve patient's ability to progress to PLOF and address remaining functional goals. (see flow sheet as applicable)     Details if applicable:     15 15 18950 Therapeutic Activity (timed):  use of dynamic activities replicating functional movements to increase ROM, strength, coordination, balance, and proprioception in order to improve patient's ability to progress to PLOF and address remaining functional goals.  (see flow sheet as applicable)     Details if applicable:     10 10 27119 Manual Therapy (timed):  decrease pain, increase ROM, increase tissue extensibility, decrease trigger points, and increase joint mobility to improve patient's ability to progress to PLOF and address remaining functional goals.  The manual therapy interventions were performed at a separate and distinct time from the therapeutic activities interventions . (see flow sheet as applicable)     Details if applicable:  (supine): Right Knee: Gr II-III AP/PA tibfem mobs, PROM w/ manually stretching/OP of knee into ext;STM/Tpr to distal quad      no palpitations/no peripheral edema/no syncope

## 2023-10-11 NOTE — ED CDU PROVIDER SUBSEQUENT DAY NOTE - NS ED MD CDU HISTORY DATE TIME DT
Pt needs refill still has a bad cough    dextromethorphan-guaiFENesin (ROBITUSSIN DM)  mg/5 mL syrup
28-Dec-2021 05:21

## 2024-06-24 NOTE — ED CDU PROVIDER DISPOSITION NOTE - WET READ LAUNCH FT
[facility] is requesting a 90 day supply  Previous Rx was sent to [facility]    Last appointment: 2/6/24  Next appointment: 10/22/24    Requested Prescriptions     Pending Prescriptions Disp Refills    metoprolol tartrate (LOPRESSOR) 25 MG tablet [Pharmacy Med Name: METOPROLOL TARTRATE TABS 25MG] 90 tablet 0     Sig: Take 0.5 tablets by mouth 2 times daily         For Pharmacy Admin Tracking Only    Program: Medication Refill  CPA in place:    Recommendation Provided To:   Intervention Detail: New Rx: 1, reason: Patient Preference  Intervention Accepted By:   Gap Closed?:    Time Spent (min): 5   There are no Wet Read(s) to document.

## 2024-09-07 NOTE — ED ADULT NURSE NOTE - CINV DISCH MEDS REVIEWED YN
LINA  lethargy  Metabolic acidosis    Pt is arousable. Still febrile and currently being treated for sepsis.   Will cont to monitor labs and Is/Os  Monitoring daily for RRT needs. LINA  ATN  lethargy  Metabolic acidosis    Pt is arousable. Still febrile and currently being treated for sepsis.   Will cont to monitor labs and Is/Os  Monitoring daily for RRT needs. n/a